# Patient Record
Sex: FEMALE | Race: WHITE | NOT HISPANIC OR LATINO | Employment: UNEMPLOYED | URBAN - METROPOLITAN AREA
[De-identification: names, ages, dates, MRNs, and addresses within clinical notes are randomized per-mention and may not be internally consistent; named-entity substitution may affect disease eponyms.]

---

## 2016-10-05 LAB
HBV SURFACE AG SERPL QL IA: NEGATIVE
HIV-1 AND HIV-2 ANTIBODIES: NON REACTIVE
RPR: NON REACTIVE
RUBELLA IMMUNE STATUS: NORMAL

## 2017-03-23 LAB
HBV SURFACE AG SERPL QL IA: NEGATIVE
HIV 1+2 AB+HIV1 P24 AG SERPL QL IA: NORMAL
HIV-1 AND HIV-2 ANTIBODIES: NON REACTIVE
PRENATAL STREP B CULTURE: NEGATIVE
RPR: NORMAL
RUBELLA IMMUNE STATUS: NORMAL

## 2017-04-05 ENCOUNTER — HOSPITAL ENCOUNTER (INPATIENT)
Facility: HOSPITAL | Age: 22
LOS: 4 days | Discharge: HOME | End: 2017-04-09
Attending: OBSTETRICS & GYNECOLOGY | Admitting: OBSTETRICS & GYNECOLOGY
Payer: OTHER GOVERNMENT

## 2017-04-05 DIAGNOSIS — O26.893 HEADACHE IN PREGNANCY, ANTEPARTUM, THIRD TRIMESTER: ICD-10-CM

## 2017-04-05 DIAGNOSIS — O14.13 SEVERE PRE-ECLAMPSIA IN THIRD TRIMESTER: ICD-10-CM

## 2017-04-05 DIAGNOSIS — O14.13 SEVERE PREECLAMPSIA, THIRD TRIMESTER: ICD-10-CM

## 2017-04-05 DIAGNOSIS — R51.9 HEADACHE IN PREGNANCY, ANTEPARTUM, THIRD TRIMESTER: ICD-10-CM

## 2017-04-05 PROBLEM — O14.10 SEVERE PREECLAMPSIA: Status: ACTIVE | Noted: 2017-04-05

## 2017-04-05 PROBLEM — O26.899 HEADACHE IN PREGNANCY, ANTEPARTUM: Status: ACTIVE | Noted: 2017-04-05

## 2017-04-05 LAB
ABO + RH BLD: NORMAL
ALBUMIN SERPL BCP-MCNC: 2.8 G/DL
ALP SERPL-CCNC: 131 U/L
ALT SERPL W/O P-5'-P-CCNC: 13 U/L
ANION GAP SERPL CALC-SCNC: 10 MMOL/L
AST SERPL-CCNC: 16 U/L
BACTERIA #/AREA URNS HPF: ABNORMAL /HPF
BASOPHILS # BLD AUTO: 0.01 K/UL
BASOPHILS NFR BLD: 0.1 %
BILIRUB SERPL-MCNC: 0.3 MG/DL
BILIRUB UR QL STRIP: NEGATIVE
BLD GP AB SCN CELLS X3 SERPL QL: NORMAL
BUN SERPL-MCNC: 5 MG/DL
CALCIUM SERPL-MCNC: 9.3 MG/DL
CHLORIDE SERPL-SCNC: 108 MMOL/L
CLARITY UR: CLEAR
CO2 SERPL-SCNC: 21 MMOL/L
COLOR UR: YELLOW
CREAT SERPL-MCNC: 0.6 MG/DL
CREAT UR-MCNC: 26.5 MG/DL
DIFFERENTIAL METHOD: ABNORMAL
EOSINOPHIL # BLD AUTO: 0.1 K/UL
EOSINOPHIL NFR BLD: 1.2 %
ERYTHROCYTE [DISTWIDTH] IN BLOOD BY AUTOMATED COUNT: 14.2 %
EST. GFR  (AFRICAN AMERICAN): >60 ML/MIN/1.73 M^2
EST. GFR  (NON AFRICAN AMERICAN): >60 ML/MIN/1.73 M^2
GLUCOSE SERPL-MCNC: 83 MG/DL
GLUCOSE UR QL STRIP: NEGATIVE
HCT VFR BLD AUTO: 34.6 %
HGB BLD-MCNC: 11.6 G/DL
HGB UR QL STRIP: NEGATIVE
HIV1+2 IGG SERPL QL IA.RAPID: NEGATIVE
KETONES UR QL STRIP: NEGATIVE
LEUKOCYTE ESTERASE UR QL STRIP: ABNORMAL
LYMPHOCYTES # BLD AUTO: 2.3 K/UL
LYMPHOCYTES NFR BLD: 20.4 %
MCH RBC QN AUTO: 29.2 PG
MCHC RBC AUTO-ENTMCNC: 33.5 %
MCV RBC AUTO: 87 FL
MICROSCOPIC COMMENT: ABNORMAL
MONOCYTES # BLD AUTO: 1 K/UL
MONOCYTES NFR BLD: 9 %
NEUTROPHILS # BLD AUTO: 7.8 K/UL
NEUTROPHILS NFR BLD: 69.3 %
NITRITE UR QL STRIP: NEGATIVE
PH UR STRIP: 6 [PH] (ref 5–8)
PLATELET # BLD AUTO: 243 K/UL
PMV BLD AUTO: 11 FL
POTASSIUM SERPL-SCNC: 3.9 MMOL/L
PROT SERPL-MCNC: 6.2 G/DL
PROT UR QL STRIP: NEGATIVE
PROT UR-MCNC: 15 MG/DL
PROT/CREAT RATIO, UR: 0.57
RBC # BLD AUTO: 3.97 M/UL
RPR SER QL: NORMAL
SODIUM SERPL-SCNC: 139 MMOL/L
SP GR UR STRIP: 1.01 (ref 1–1.03)
URN SPEC COLLECT METH UR: ABNORMAL
UROBILINOGEN UR STRIP-ACNC: NEGATIVE EU/DL
WBC # BLD AUTO: 11.2 K/UL
WBC #/AREA URNS HPF: 4 /HPF (ref 0–5)

## 2017-04-05 PROCEDURE — 63600175 PHARM REV CODE 636 W HCPCS: Performed by: ADVANCED PRACTICE MIDWIFE

## 2017-04-05 PROCEDURE — 86701 HIV-1ANTIBODY: CPT

## 2017-04-05 PROCEDURE — 72100002 HC LABOR CARE, 1ST 8 HOURS

## 2017-04-05 PROCEDURE — 51702 INSERT TEMP BLADDER CATH: CPT

## 2017-04-05 PROCEDURE — 86592 SYPHILIS TEST NON-TREP QUAL: CPT

## 2017-04-05 PROCEDURE — 84156 ASSAY OF PROTEIN URINE: CPT

## 2017-04-05 PROCEDURE — 25000003 PHARM REV CODE 250: Performed by: ADVANCED PRACTICE MIDWIFE

## 2017-04-05 PROCEDURE — 3E033VJ INTRODUCTION OF OTHER HORMONE INTO PERIPHERAL VEIN, PERCUTANEOUS APPROACH: ICD-10-PCS | Performed by: OBSTETRICS & GYNECOLOGY

## 2017-04-05 PROCEDURE — 81000 URINALYSIS NONAUTO W/SCOPE: CPT

## 2017-04-05 PROCEDURE — 86900 BLOOD TYPING SEROLOGIC ABO: CPT

## 2017-04-05 PROCEDURE — 85025 COMPLETE CBC W/AUTO DIFF WBC: CPT

## 2017-04-05 PROCEDURE — 72100003 HC LABOR CARE, EA. ADDL. 8 HRS

## 2017-04-05 PROCEDURE — 99211 OFF/OP EST MAY X REQ PHY/QHP: CPT | Mod: 25

## 2017-04-05 PROCEDURE — 80053 COMPREHEN METABOLIC PANEL: CPT

## 2017-04-05 PROCEDURE — 86901 BLOOD TYPING SEROLOGIC RH(D): CPT

## 2017-04-05 PROCEDURE — 11000001 HC ACUTE MED/SURG PRIVATE ROOM

## 2017-04-05 PROCEDURE — 25000003 PHARM REV CODE 250: Performed by: OBSTETRICS & GYNECOLOGY

## 2017-04-05 PROCEDURE — 59025 FETAL NON-STRESS TEST: CPT

## 2017-04-05 RX ORDER — SODIUM CHLORIDE, SODIUM LACTATE, POTASSIUM CHLORIDE, CALCIUM CHLORIDE 600; 310; 30; 20 MG/100ML; MG/100ML; MG/100ML; MG/100ML
INJECTION, SOLUTION INTRAVENOUS CONTINUOUS
Status: DISCONTINUED | OUTPATIENT
Start: 2017-04-05 | End: 2017-04-07

## 2017-04-05 RX ORDER — BUTALBITAL, ACETAMINOPHEN AND CAFFEINE 50; 325; 40 MG/1; MG/1; MG/1
1 TABLET ORAL EVERY 4 HOURS PRN
Status: DISCONTINUED | OUTPATIENT
Start: 2017-04-05 | End: 2017-04-05

## 2017-04-05 RX ORDER — BUTALBITAL, ACETAMINOPHEN AND CAFFEINE 50; 325; 40 MG/1; MG/1; MG/1
1 TABLET ORAL ONCE
Status: COMPLETED | OUTPATIENT
Start: 2017-04-05 | End: 2017-04-05

## 2017-04-05 RX ORDER — OXYTOCIN/RINGER'S LACTATE 20/1000 ML
2 PLASTIC BAG, INJECTION (ML) INTRAVENOUS CONTINUOUS
Status: DISCONTINUED | OUTPATIENT
Start: 2017-04-05 | End: 2017-04-05

## 2017-04-05 RX ORDER — MISOPROSTOL 100 MCG
25 TABLET ORAL EVERY 4 HOURS
Status: COMPLETED | OUTPATIENT
Start: 2017-04-05 | End: 2017-04-06

## 2017-04-05 RX ORDER — HYDRALAZINE HYDROCHLORIDE 20 MG/ML
5 INJECTION INTRAMUSCULAR; INTRAVENOUS ONCE
Status: DISCONTINUED | OUTPATIENT
Start: 2017-04-05 | End: 2017-04-05

## 2017-04-05 RX ORDER — MAGNESIUM SULFATE HEPTAHYDRATE 40 MG/ML
2 INJECTION, SOLUTION INTRAVENOUS CONTINUOUS
Status: DISCONTINUED | OUTPATIENT
Start: 2017-04-05 | End: 2017-04-07

## 2017-04-05 RX ORDER — ONDANSETRON 8 MG/1
8 TABLET, ORALLY DISINTEGRATING ORAL EVERY 8 HOURS PRN
Status: DISCONTINUED | OUTPATIENT
Start: 2017-04-05 | End: 2017-04-05

## 2017-04-05 RX ORDER — ACETAMINOPHEN 500 MG
500 TABLET ORAL EVERY 6 HOURS PRN
Status: DISCONTINUED | OUTPATIENT
Start: 2017-04-05 | End: 2017-04-05

## 2017-04-05 RX ORDER — MISOPROSTOL 100 MCG
25 TABLET ORAL EVERY 4 HOURS
Status: DISCONTINUED | OUTPATIENT
Start: 2017-04-05 | End: 2017-04-05

## 2017-04-05 RX ORDER — ACETAMINOPHEN 500 MG
1000 TABLET ORAL ONCE
Status: COMPLETED | OUTPATIENT
Start: 2017-04-05 | End: 2017-04-05

## 2017-04-05 RX ORDER — MAGNESIUM SULFATE HEPTAHYDRATE 40 MG/ML
2 INJECTION, SOLUTION INTRAVENOUS ONCE
Status: COMPLETED | OUTPATIENT
Start: 2017-04-05 | End: 2017-04-05

## 2017-04-05 RX ORDER — CALCIUM GLUCONATE 98 MG/ML
1 INJECTION, SOLUTION INTRAVENOUS
Status: DISCONTINUED | OUTPATIENT
Start: 2017-04-05 | End: 2017-04-09 | Stop reason: HOSPADM

## 2017-04-05 RX ORDER — MAGNESIUM SULFATE HEPTAHYDRATE 40 MG/ML
4 INJECTION, SOLUTION INTRAVENOUS ONCE
Status: COMPLETED | OUTPATIENT
Start: 2017-04-05 | End: 2017-04-05

## 2017-04-05 RX ORDER — SODIUM CHLORIDE, SODIUM LACTATE, POTASSIUM CHLORIDE, CALCIUM CHLORIDE 600; 310; 30; 20 MG/100ML; MG/100ML; MG/100ML; MG/100ML
1000 INJECTION, SOLUTION INTRAVENOUS CONTINUOUS
Status: ACTIVE | OUTPATIENT
Start: 2017-04-05 | End: 2017-04-05

## 2017-04-05 RX ORDER — TERBUTALINE SULFATE 1 MG/ML
0.25 INJECTION SUBCUTANEOUS
Status: DISCONTINUED | OUTPATIENT
Start: 2017-04-05 | End: 2017-04-07

## 2017-04-05 RX ORDER — ONDANSETRON 8 MG/1
8 TABLET, ORALLY DISINTEGRATING ORAL EVERY 8 HOURS PRN
Status: DISCONTINUED | OUTPATIENT
Start: 2017-04-05 | End: 2017-04-07

## 2017-04-05 RX ADMIN — BUTALBITAL, ACETAMINOPHEN, AND CAFFEINE 1 TABLET: 50; 325; 40 TABLET ORAL at 03:04

## 2017-04-05 RX ADMIN — BUTALBITAL, ACETAMINOPHEN, AND CAFFEINE 1 TABLET: 50; 325; 40 TABLET ORAL at 06:04

## 2017-04-05 RX ADMIN — MAGNESIUM SULFATE IN WATER 4 G: 40 INJECTION, SOLUTION INTRAVENOUS at 04:04

## 2017-04-05 RX ADMIN — SODIUM CHLORIDE, SODIUM LACTATE, POTASSIUM CHLORIDE, AND CALCIUM CHLORIDE 1000 ML: .6; .31; .03; .02 INJECTION, SOLUTION INTRAVENOUS at 04:04

## 2017-04-05 RX ADMIN — Medication 25 MCG: at 05:04

## 2017-04-05 RX ADMIN — MAGNESIUM SULFATE IN WATER 2 G/HR: 40 INJECTION, SOLUTION INTRAVENOUS at 05:04

## 2017-04-05 RX ADMIN — ACETAMINOPHEN 1000 MG: 500 TABLET ORAL at 11:04

## 2017-04-05 RX ADMIN — Medication 25 MCG: at 04:04

## 2017-04-05 RX ADMIN — MAGNESIUM SULFATE IN WATER 2 G: 40 INJECTION, SOLUTION INTRAVENOUS at 04:04

## 2017-04-05 RX ADMIN — SODIUM CHLORIDE, SODIUM LACTATE, POTASSIUM CHLORIDE, AND CALCIUM CHLORIDE: .6; .31; .03; .02 INJECTION, SOLUTION INTRAVENOUS at 04:04

## 2017-04-05 RX ADMIN — Medication 25 MCG: at 08:04

## 2017-04-05 NOTE — IP AVS SNAPSHOT
[facility]  [id number] [place] [address] [address] [place] [id number]           Patient Discharge Instructions   Our goal is to set you up for success. This packet includes information on your condition, medications, and your home care.  It will help you care for yourself to prevent having to return to the hospital.     Please ask your nurse if you have any questions.      There are many details to remember when preparing to leave the hospital. Here is what you will need to do:    1. Take your medicine. If you are prescribed medications, review your Medication List on the following pages. You may have new medications to  at the pharmacy and others that you'll need to stop taking. Review the instructions for how and when to take your medications. Talk with your doctor or nurses if you are unsure of what to do.     2. Go to your follow-up appointments. Specific follow-up information is listed in the following pages. Your may be contacted by a nurse or clinical provider about future appointments. Be sure we have all of the phone numbers to reach you. Please contact your provider's office if you are unable to make an appointment.     3. Watch for warning signs. Your doctor or nurse will give you detailed warning signs to watch for and when to call for assistance. These instructions may also include educational information about your condition. If you experience any of warning signs to your health, call your doctor.           [facility]  Unless otherwise directed by your provider, please   contact [facility], our nurse care line   that is available for 24/7 assistance.     [phone] (toll-free)     Registered nurses in the [facility]   provide: appointment scheduling, clinical advisement, health education, and other advisory services.                  ** Verify the list of medication(s) below is accurate and up to date. Carry this with you in case of emergency. If your  medications have changed, please notify your healthcare provider.             Medication List      CONTINUE taking these medications        Additional Info                      multivitamin capsule   Refills:  0   Dose:  1 capsule    Instructions:  Take 1 capsule by mouth once daily.     Begin Date    AM    Noon    PM    Bedtime                  Please bring to all follow up appointments:    1. A copy of your discharge instructions.  2. All medicines you are currently taking in their original bottles.  3. Identification and insurance card.    Please arrive 15 minutes ahead of scheduled appointment time.    Please call 24 hours in advance if you must reschedule your appointment and/or time.        Your Scheduled Appointments     Apr 17, 2017 11:00 AM CDT   Nurse Visit with OB GYN NURSECARISSA - OB/ GYN ([facility])    [address] [place] [id number]   [phone]            May 10, 2017 10:00 AM CDT   Post Partum with MARICRUZ Nowak - OB/ GYN ([facility])    [address] [place] [id number]   [phone]              Follow-up Information     Follow up In 1 week.    Why:  [facility]          Discharge Instructions       Mother Self Care:    Activity: Avoid strenuous exercise and get adequate rest.  No driving until your physician gives you consent.  Emotional Changes: The grieving process has many different stages, be prepared to experience lots of emotional ups and downs. Identify people to be your support system, and do not hesitate to call our  if you need someone to talk to.   Breast Care: You may notice milk leaking from your breasts. Wear a support bra 24 hours a day for one week or wrap breasts in an ace bandage if needed to stop milk production.  Avoid stimulation to breasts.  You may use ice packs for discomfort.  Corrina-Care/Vaginal Bleeding: Remember to use your corrina-bottle after urinating.  Your flow  "will change from red, to pink, to yellow/white color over a period of 2 weeks.  Menstruation will return in 3-8 weeks.  Episiotomy Vaginal Delivery: Stitches will dissolve within 10 days to 3 weeks.  Warm baths, tucks, and dermoplast spray will promote healing.  Avoid bubble baths or strong soaps.   Section/Tubal Ligation: Keep incision clean and dry.  Please remove steri-strips in 5-7 days.  You may shower, but avoid baths.  Sexual Activity/Pelvic Rest: No sexual activity, tampons, or douching until your physician gives you consent.  Diet: Continue to eat from the five basic food groups, including plenty of protein, fruits, vegetables, and whole grains.  Limit empty calories and high fat foods.  Drink enough fluids to satisfy thirst.  Constipation/Hemorrhoids: Drink plenty of water.  You may take a stool softener or natural laxative (Metamucil). You may use tucks or hemorrhoid ointment and soak in a warm tub.    CALL YOUR OB DOCTOR IF ANY OF THE FOLLOWING OCCURS:  *Heavy bleeding - saturating a pad an hour or passing any large (2-3 inches in size) blood clots.  *Any pain, redness, or tenderness in lower leg.  *You cannot care for yourself  *Any signs of infection-      - Temperature greater than 100.5 degrees F      - Foul smelling vaginal discharge and/or incisional drainage      - Increased episiotomy or incisional pain      - Hot, hard, red or sore area on breast      - Flu-like symptoms      - Any urgency, frequency or burning with urination      Primary Diagnosis     Your primary diagnosis was:  Normal Vaginal Delivery      Admission Information     Date & Time Provider Department CSN    2017  1:30 AM Anum Patel MD [facility] [id number]      Care Providers     Provider Role Specialty Primary office phone    Anum Patel MD Attending Provider Obstetrics [phone]      Your Vitals Were     BP Pulse Temp Resp Height Weight    133/63 74 98.5 °F (36.9 °C) (Oral) 18 5' 4" (1.626 " m) 94.3 kg (208 lb)    SpO2 BMI             94% 35.7 kg/m2         Recent Lab Values     No lab values to display.      Allergies as of 4/9/2017        Reactions    Amoxil [Amoxicillin]       Advance Directives     An advance directive is a document which, in the event you are no longer able to make decisions for yourself, tells your healthcare team what kind of treatment you do or do not want to receive, or who you would like to make those decisions for you.  If you do not currently have an advance directive, [facility] encourages you to create one.  For more information call:  [phone]), [phone] ([phone]),  or log on to [email or web address].        Language Assistance Services     ATTENTION: Language assistance services are available, free of charge. Please call [phone].      ATENCIÓN: Si habla español, tiene a dominguez disposición servicios gratuitos de asistencia lingüística. Llame al [phone].     CHÚ Ý: N?u b?n nói Ti?ng Vi?t, có các d?ch v? h? tr? ngôn ng? mi?n phí dành cho b?n. G?i s? [phone].        [facility] Sign-Up     Activating your [facility] account is as easy as 1-2-3!     1) Visit [email or web address], select Sign Up Now, enter this activation code and your date of birth, then select Next.  [email or web address]  Expires: 5/24/2017  8:36 AM      2) Create a username and password to use when you visit [facility] in the future and select a security question in case you lose your password and select Next.    3) Enter your e-mail address and click Sign Up!    Additional Information  If you have questions, please e-mail [email or web address] or call [phone] to talk to our [facility] staff. Remember, [facility] is NOT to be used for urgent needs. For medical emergencies, dial 911.          [facility] complies with applicable Federal civil rights laws and does not discriminate on the basis of race, color, national origin, age, disability, or sex.

## 2017-04-05 NOTE — NURSING
"Discussed feeding choice with mother.  Reviewed benefits of breastfeeding.  Patient given "What to Expect in the First 48 Hours" handout. Mother states her intention is to BF. First time mom/no previous feeding experience.     Coffective counseling sheet Fall in Love discussed with mother. Reinforced immediate skin to skin, the magic first hour, importance of the first feeding and delaying routine procedures. Encouraged mother to download [employer] mobile alley if she has not already done so. Mother verbalies understanding.    "

## 2017-04-05 NOTE — H&P
"    Labor and Delivery Triage H&P Note - Preeclampsia/PIH      Subjective:    Patient Info:  Beth Jo         21 y.o.    female    1995     Patient presents at 37 and 6/7 weeks gestation with EDC 2017.  She presents with severe headache from home. Headache started at 2100, reports headache as staring at the base of her head and now frontal.  Her  also reports that she is randomly twitching.  Associated symptoms include headache, RUQ abdominal pain, edema.   Fetal Movement: normal. Her current obstetrical history is significant for none per pt. She reports no bleeding and no leaking.       OB History      Para Term  AB TAB SAB Ectopic Multiple Living    1                    Estimated Date of Delivery: 17    Gestational Age: 37w6d    Past Medical History:  No past medical history on file.     Past Surgical History:  Beth  has no past surgical history on file.    Social History:   Beth      Family History:  No family history on file.    Allergies:  Amoxil [amoxicillin]    Meds Prior to Arrival:  Prescriptions Prior to Admission   Medication Sig Dispense Refill Last Dose    multivitamin capsule Take 1 capsule by mouth once daily.   2017 at Unknown time       Review of Systems:  Constitutional: no fever or chills, pain well controlled  Respiratory: no cough or shortness of breath  Cardiovascular: no chest pain or palpitations  Gastrointestinal: no nausea or vomiting, tolerating diet  Genitourinary: no hematuria or dysuria  Musculoskeletal: no arthralgias or myalgias  Neurological: positive for headaches and twitching      Objective:    Recent Vitals:  BP (!) 140/79  Pulse 66  Temp 98.1 °F (36.7 °C) (Oral)  Resp 20  Ht 5' 4" (1.626 m)  Wt 94.3 kg (208 lb)  Breastfeeding? No  BMI 35.7 kg/m2  Exam:    SVE: /-1, midposition, soft    General Appearance:    Alert, cooperative, moderate distress, eyes closed, appears   stated age, obese   Head:    Normocephalic, without " obvious abnormality, atraumatic   Lungs:     Clear to auscultation bilaterally, respirations unlabored    Heart:    Regular rate and rhythm, S1 and S2 normal, no murmur   Abdomen:     Soft, non-tender, specific RUQ tender, gravid, S=D for 38 week gestation   Genitalia:       Deferred. Rectal exam deferred.     Extremities:   Extremities normal, atraumatic, edema present 3+   Pulses:   2+ and symmetric all extremities, Reflexes 3+, Clonus absent   Skin:   Skin color, texture normal, no rashes or lesions     Lab Results:  Recent Results (from the past 36 hour(s))   Urinalysis    Collection Time: 04/05/17  2:00 AM   Result Value Ref Range    Specimen UA Urine, Clean Catch     Color, UA Yellow Yellow, Straw, Lauren    Appearance, UA Clear Clear    pH, UA 6.0 5.0 - 8.0    Specific Gravity, UA 1.010 1.005 - 1.030    Protein, UA Negative Negative    Glucose, UA Negative Negative    Ketones, UA Negative Negative    Bilirubin (UA) Negative Negative    Occult Blood UA Negative Negative    Nitrite, UA Negative Negative    Urobilinogen, UA Negative <2.0 EU/dL    Leukocytes, UA 1+ (A) Negative   Protein / creatinine ratio, urine    Collection Time: 04/05/17  2:00 AM   Result Value Ref Range    Protein, Urine Random 15 0 - 15 mg/dL    Creatinine, Random Ur 26.5 15.0 - 325.0 mg/dL    Prot/Creat Ratio, Ur 0.57 (H) 0.00 - 0.20   Urinalysis Microscopic    Collection Time: 04/05/17  2:00 AM   Result Value Ref Range    WBC, UA 4 0 - 5 /hpf    Bacteria, UA Few (A) None-Occ /hpf    Microscopic Comment SEE COMMENT    CBC auto differential    Collection Time: 04/05/17  2:18 AM   Result Value Ref Range    WBC 11.20 3.90 - 12.70 K/uL    RBC 3.97 (L) 4.00 - 5.40 M/uL    Hemoglobin 11.6 (L) 12.0 - 16.0 g/dL    Hematocrit 34.6 (L) 37.0 - 48.5 %    MCV 87 82 - 98 fL    MCH 29.2 27.0 - 31.0 pg    MCHC 33.5 32.0 - 36.0 %    RDW 14.2 11.5 - 14.5 %    Platelets 243 150 - 350 K/uL    MPV 11.0 9.2 - 12.9 fL    Gran # 7.8 (H) 1.8 - 7.7 K/uL    Lymph #  2.3 1.0 - 4.8 K/uL    Mono # 1.0 0.3 - 1.0 K/uL    Eos # 0.1 0.0 - 0.5 K/uL    Baso # 0.01 0.00 - 0.20 K/uL    Gran% 69.3 38.0 - 73.0 %    Lymph% 20.4 18.0 - 48.0 %    Mono% 9.0 4.0 - 15.0 %    Eosinophil% 1.2 0.0 - 8.0 %    Basophil% 0.1 0.0 - 1.9 %    Differential Method Automated    Comprehensive metabolic panel    Collection Time: 04/05/17  2:18 AM   Result Value Ref Range    Sodium 139 136 - 145 mmol/L    Potassium 3.9 3.5 - 5.1 mmol/L    Chloride 108 95 - 110 mmol/L    CO2 21 (L) 23 - 29 mmol/L    Glucose 83 70 - 110 mg/dL    BUN, Bld 5 (L) 6 - 20 mg/dL    Creatinine 0.6 0.5 - 1.4 mg/dL    Calcium 9.3 8.7 - 10.5 mg/dL    Total Protein 6.2 6.0 - 8.4 g/dL    Albumin 2.8 (L) 3.5 - 5.2 g/dL    Total Bilirubin 0.3 0.1 - 1.0 mg/dL    Alkaline Phosphatase 131 55 - 135 U/L    AST 16 10 - 40 U/L    ALT 13 10 - 44 U/L    Anion Gap 10 8 - 16 mmol/L    eGFR if African American >60 >60 mL/min/1.73 m^2    eGFR if non African American >60 >60 mL/min/1.73 m^2         Diagnostic Studies:    Baseline: 135 bpm, Variability: Good {> 6 bpm) and Accelerations: Reactive    Serial BPs  CBC  CMP  UA  PCR urine      Assessment:    Dx:   1. Headache in pregnancy, antepartum, third trimester    2. Severe preeclampsia, third trimester      Active Problems:  Patient Active Problem List    Diagnosis Date Noted    Headache in pregnancy, antepartum 04/05/2017    Severe pre-eclampsia in third trimester 04/05/2017    Severe preeclampsia 04/05/2017       Plan:    Admit to MD: Anum Patel MD    Admit to: Outpatient procedure    observation    Code Status: Full Code       Diagnostic Plan/Orders:  Orders Placed This Encounter   Procedures    Urinalysis    Protein / creatinine ratio, urine    CBC auto differential    Comprehensive metabolic panel    Urinalysis Microscopic    RPR    Rapid HIV    Diet NPO Except for: Ice Chips, Other (Comment)    Ambulate    Place KARLI hose    Place sequential compression device    Discontinue  oxytocin    Administer oxygen at 10 L/min per non rebreather face mask    Change position, roll left    Change position, roll right    Check Temperature, Membranes Intact    Check Temperature, Membranes Ruptured    Baseline Deep Tendon Reflexes, respirations, vital signs    Vital Signs Every Hour    Vital signs Q15 minutes x 4    Check Temperature    Cervical Exam    Fetal / Uterine Monitoring    Fetal monitoring - scalp electrode    Strict intake and output maximum IV/total intake not to exceed 125 mL/hr    External fetal monitoring    Discontinue Ringers Lactate with Oxytocin infusion    Notify Physician    Notify physician     Notify physician     Notify physician    Notify physician    Notify physician (specify)    Place sequential compression device    Inpatient consult to Anesthesiology    Type & Screen    Admit to Inpatient

## 2017-04-05 NOTE — PLAN OF CARE
Problem: Patient Care Overview  Goal: Plan of Care Review  Outcome: Ongoing (interventions implemented as appropriate)  VSS. Cytotec induction. Mag at 2g/hr. Reflexes 3+. Ross in place. SCDs on. Rails padded. Suction and oxygen at bedside. Headache during shift. Tylenol given. Pt still planning natural delivery. Family at bedside. Will continue to monitor.

## 2017-04-05 NOTE — PLAN OF CARE
Problem: Patient Care Overview  Goal: Plan of Care Review  Outcome: Ongoing (interventions implemented as appropriate)  Admit with severe headaches and PCR ratio of 0.57. Fioricet given with relief. BP's 140s-150s/60s-80s. Loading dose of 6 g Magnesium Sulfate given followed by continuous 2 g/hr Magnesium Sulfate infusion. Ross, SCDs, and pulse ox in place. Cytotec placed @ 0545. Currently 1/70/-1.

## 2017-04-05 NOTE — PROGRESS NOTES
S:  @ 37.6 wks, admitted for management of pre-eclampsia and IOL w/ cytotec. Has had total of 1 dose vaginally w rapid response to same with freqent UC's and cramping. No bleeding or lof. Patient was planning NCB with [facility] and is processing new poc with current clinical status. Still tearful, but coping with events. Magnesium sulfate at 2 gm/hr. Patient had severe headache on arrival but currently resolved. Denies blurred vision. No n/v and asking for juice/ foods when able. Has expressed that understands complete BR indicated for care at this point despite was hoping to ambulate in labor.   O: /80, p 82, r 20  Ox sat 100%.        DTR's 2-3+, no clonus. Edema 3+ pitting lower extrem.        General: AAO x 3       Heart: RRR w/o m.       Chest: CTA throughout.       Abd: soft to palpation in between ctx. Is ctx q 2-3 via toco, mild to mod. Baby vertex by Leopolds'. EFW 6-1/2-7#.       FHT: Cat 1 , baseline 140, moderate variability with accels. No decels noted.        VE: Deferred (previous /- with cytotec placement).       Ext: As above . SCD's in use.        Ross with adequate output.   A:       Severe pre-e      IOL w cytotec  P: Continue present poc.       Cytotec q 4 as ordered if eligible.       To remain NPO till further assessment of clinical status and VS. Will allow juice/ crackers IF not hypertensive, no h/a, no indication for pain medication/or epidural , but understands this plan is very tentative due to current clinical concerns.      BEDREST cont.       Will re-evaluate in 2-3 hours.

## 2017-04-05 NOTE — PROGRESS NOTES
"Patient with ongoing contractions that now "feel a little bit more intense, but still no more than a 4/10. Headache slightly increased at present. Discussed with patient need for VE now to assess whether more cytotec vs singh bulb indicated.   Patient expressed anxiety over discomfort/vaginismus history. Reassured.  VSS  Fht's cat 1  VE: EOS 1/ IOS FT/ 70/ -2  Ctx q 3-4  P- Pt tolerated exam well, but no cervical change definitively noted and not able to place singh bulb at present.       Will proceed with additional cytotec dosing, orally for ctx enhancement      Tylenol as ordered for h/a       Continue MG so4 @ 2 gm/hr.      Re-evaluate in 3-4 hours.   "

## 2017-04-05 NOTE — PROGRESS NOTES
"Patient remains on mag sulfate at 2 gms/hr. Cramping continues as before and noted q 2-4 mins apart. Pain rated a "3-4/ 10".  Very mild headache noted behind right ear and jaw. No lof, bleeding or spotting.   Resting in rt tilt position at present.   VSS- 137/66, p- 86. Ox sat 98%. No change in mentation or respiration status.   Chest: remains CTA  ABD: soft with contractions q 2-4 via toco.  VE: deferred  FHT: cat 1  Cytotec remains on hold s econdary to contractions > 3/ 10 min.   Will repeat cytotec if contractions space out.    "

## 2017-04-06 PROCEDURE — 63600175 PHARM REV CODE 636 W HCPCS: Performed by: ADVANCED PRACTICE MIDWIFE

## 2017-04-06 PROCEDURE — 25000003 PHARM REV CODE 250: Performed by: OBSTETRICS & GYNECOLOGY

## 2017-04-06 PROCEDURE — 72100002 HC LABOR CARE, 1ST 8 HOURS

## 2017-04-06 PROCEDURE — 25000003 PHARM REV CODE 250: Performed by: ADVANCED PRACTICE MIDWIFE

## 2017-04-06 PROCEDURE — 11000001 HC ACUTE MED/SURG PRIVATE ROOM

## 2017-04-06 PROCEDURE — 72100003 HC LABOR CARE, EA. ADDL. 8 HRS

## 2017-04-06 RX ORDER — MISOPROSTOL 100 MCG
25 TABLET ORAL EVERY 4 HOURS
Status: ACTIVE | OUTPATIENT
Start: 2017-04-06 | End: 2017-04-06

## 2017-04-06 RX ORDER — OXYTOCIN/RINGER'S LACTATE 20/1000 ML
2 PLASTIC BAG, INJECTION (ML) INTRAVENOUS CONTINUOUS
Status: DISCONTINUED | OUTPATIENT
Start: 2017-04-07 | End: 2017-04-07

## 2017-04-06 RX ORDER — BUTORPHANOL TARTRATE 1 MG/ML
2 INJECTION INTRAMUSCULAR; INTRAVENOUS ONCE
Status: COMPLETED | OUTPATIENT
Start: 2017-04-06 | End: 2017-04-06

## 2017-04-06 RX ADMIN — MAGNESIUM SULFATE IN WATER 2 G/HR: 40 INJECTION, SOLUTION INTRAVENOUS at 04:04

## 2017-04-06 RX ADMIN — SODIUM CHLORIDE, SODIUM LACTATE, POTASSIUM CHLORIDE, AND CALCIUM CHLORIDE: .6; .31; .03; .02 INJECTION, SOLUTION INTRAVENOUS at 06:04

## 2017-04-06 RX ADMIN — SODIUM CHLORIDE, SODIUM LACTATE, POTASSIUM CHLORIDE, AND CALCIUM CHLORIDE: .6; .31; .03; .02 INJECTION, SOLUTION INTRAVENOUS at 05:04

## 2017-04-06 RX ADMIN — MAGNESIUM SULFATE IN WATER 2 G/HR: 40 INJECTION, SOLUTION INTRAVENOUS at 12:04

## 2017-04-06 RX ADMIN — Medication 25 MCG: at 12:04

## 2017-04-06 RX ADMIN — Medication 25 MCG: at 04:04

## 2017-04-06 RX ADMIN — BUTORPHANOL TARTRATE 2 MG: 1 INJECTION, SOLUTION INTRAMUSCULAR; INTRAVENOUS at 11:04

## 2017-04-06 RX ADMIN — DINOPROSTONE 10 MG: 10 INSERT VAGINAL at 09:04

## 2017-04-06 RX ADMIN — MAGNESIUM SULFATE IN WATER 2 G/HR: 40 INJECTION, SOLUTION INTRAVENOUS at 06:04

## 2017-04-06 NOTE — PROGRESS NOTES
S:G1PO, @ 38 wks, Pre-E, IOL ongoing. Cytotec # 3 given po. Ongoing mild cramping, no lof, bleeding, spotting.       Patient remains very anxious/over VE.  No headache at present. Very hungry. MagSo4 continue @ 2gm/hr. Last exam ~ 2pm with closed IOS and not eligible for singh bulb placement. Patient with significant h/o vaginismus and exam difficult with much relaxation/ verbal support given to accomplish exam.   O: VSS:  139/81, p 75- Ox sats 100%, no mentation or respiratory depression.        FHT's cat 1        VE: deferred.        CTX: q 5         Singh: output adequate.   A: , IOL, pre-e      Severe vaginismus      Cytotec po.  P: Continue cytotec po q 4 to maintain contraction adequacy.       VE  In am with attempt for singh bulb if eligible.

## 2017-04-06 NOTE — PROGRESS NOTES
S: doing well, visiting with her mother  O:  VS reviewed, afebrile   Vitals:    04/06/17 1121 04/06/17 1123 04/06/17 1128 04/06/17 1133   BP: 126/63      Pulse: 76 72 78 76   Resp:       Temp:       TempSrc:       SpO2:  98% 98% 98%   Weight:       Height:           FHTs Cat 1   UC Q 2-5 min  SVE deferred, cervidil in place    A: IUP @ 38w0d ;     Patient Active Problem List   Diagnosis    Headache in pregnancy, antepartum    Severe pre-eclampsia in third trimester    Severe preeclampsia       P:   Continue close monitoring of maternal/fetal status, discussed plan, CPM for now

## 2017-04-06 NOTE — PROGRESS NOTES
CNM at bedside with POC discussed with patient and spouse. Cervidil to be inserted after patient allowed to eat light meal. Patient may have singh discontinued after cervidil insertion and bedpan used.

## 2017-04-06 NOTE — PLAN OF CARE
Problem: Patient Care Overview  Goal: Plan of Care Review  Outcome: Ongoing (interventions implemented as appropriate)  Patient continued on magnesium infusion throughout shift. Reflexes average, breath sounds clear. Urine output normal. Patient tolerated Cytotec well. Patient denies needs at this time.

## 2017-04-06 NOTE — PROGRESS NOTES
S:G1PO, @ 38 wks, Pre-E, IOL ongoing. Cytotec # 5 completed.  Ongoing mild cramping, no lof, bleeding, spotting.       MagSo4 continue @ 2gm/hr. Last exam ~ 2pm with closed IOS and not eligible for singh bulb placement. Limited ve due to severe anxiety w ve. But no s/s active labor  O: VSS:  127/67  p 71- Ox sats 97%         FHT's cat 1        VE: deferred.        CTX: q 5         Singh: output adequate.   A: , IOL, pre-e      Severe vaginismus      Cytotec po.  P: Continue cytotec po q 4 to maintain contraction adequacy.       VE  In am with attempt for singh bulb if eligible.

## 2017-04-06 NOTE — PLAN OF CARE
Problem: Patient Care Overview  Goal: Plan of Care Review  Outcome: Ongoing (interventions implemented as appropriate)  Patient with cervidil at 0918 this am.

## 2017-04-07 ENCOUNTER — ANESTHESIA (OUTPATIENT)
Dept: OBSTETRICS AND GYNECOLOGY | Facility: HOSPITAL | Age: 22
End: 2017-04-07
Payer: OTHER GOVERNMENT

## 2017-04-07 PROBLEM — O14.10 SEVERE PREECLAMPSIA: Status: RESOLVED | Noted: 2017-04-05 | Resolved: 2017-04-07

## 2017-04-07 PROCEDURE — 72200005 HC VAGINAL DELIVERY LEVEL II

## 2017-04-07 PROCEDURE — 27800517 HC TRAY,EPIDURAL-CONTINUOUS: Performed by: NURSE ANESTHETIST, CERTIFIED REGISTERED

## 2017-04-07 PROCEDURE — 63600175 PHARM REV CODE 636 W HCPCS: Performed by: NURSE ANESTHETIST, CERTIFIED REGISTERED

## 2017-04-07 PROCEDURE — 25000003 PHARM REV CODE 250: Performed by: ADVANCED PRACTICE MIDWIFE

## 2017-04-07 PROCEDURE — 62326 NJX INTERLAMINAR LMBR/SAC: CPT | Performed by: NURSE ANESTHETIST, CERTIFIED REGISTERED

## 2017-04-07 PROCEDURE — 72100003 HC LABOR CARE, EA. ADDL. 8 HRS

## 2017-04-07 PROCEDURE — 0KQM0ZZ REPAIR PERINEUM MUSCLE, OPEN APPROACH: ICD-10-PCS | Performed by: OBSTETRICS & GYNECOLOGY

## 2017-04-07 PROCEDURE — 11000001 HC ACUTE MED/SURG PRIVATE ROOM

## 2017-04-07 PROCEDURE — 25000003 PHARM REV CODE 250: Performed by: OBSTETRICS & GYNECOLOGY

## 2017-04-07 PROCEDURE — 25000003 PHARM REV CODE 250: Performed by: NURSE ANESTHETIST, CERTIFIED REGISTERED

## 2017-04-07 PROCEDURE — 63600175 PHARM REV CODE 636 W HCPCS: Performed by: OBSTETRICS & GYNECOLOGY

## 2017-04-07 PROCEDURE — 59409 OBSTETRICAL CARE: CPT | Mod: ,,, | Performed by: OBSTETRICS & GYNECOLOGY

## 2017-04-07 RX ORDER — IBUPROFEN 600 MG/1
600 TABLET ORAL EVERY 6 HOURS
Status: DISCONTINUED | OUTPATIENT
Start: 2017-04-07 | End: 2017-04-09 | Stop reason: HOSPADM

## 2017-04-07 RX ORDER — HYDROCORTISONE 25 MG/G
CREAM TOPICAL 3 TIMES DAILY PRN
Status: DISCONTINUED | OUTPATIENT
Start: 2017-04-07 | End: 2017-04-09 | Stop reason: HOSPADM

## 2017-04-07 RX ORDER — ROPIVACAINE HYDROCHLORIDE 2 MG/ML
INJECTION, SOLUTION EPIDURAL; INFILTRATION; PERINEURAL CONTINUOUS PRN
Status: DISCONTINUED | OUTPATIENT
Start: 2017-04-07 | End: 2017-04-07

## 2017-04-07 RX ORDER — ONDANSETRON 8 MG/1
8 TABLET, ORALLY DISINTEGRATING ORAL EVERY 8 HOURS PRN
Status: DISCONTINUED | OUTPATIENT
Start: 2017-04-07 | End: 2017-04-09 | Stop reason: HOSPADM

## 2017-04-07 RX ORDER — SODIUM CHLORIDE, SODIUM LACTATE, POTASSIUM CHLORIDE, CALCIUM CHLORIDE 600; 310; 30; 20 MG/100ML; MG/100ML; MG/100ML; MG/100ML
INJECTION, SOLUTION INTRAVENOUS CONTINUOUS
Status: DISCONTINUED | OUTPATIENT
Start: 2017-04-07 | End: 2017-04-09 | Stop reason: HOSPADM

## 2017-04-07 RX ORDER — AMMONIA 15 % (W/V)
0.3 AMPUL (EA) INHALATION CONTINUOUS PRN
Status: DISCONTINUED | OUTPATIENT
Start: 2017-04-07 | End: 2017-04-09 | Stop reason: HOSPADM

## 2017-04-07 RX ORDER — MAGNESIUM SULFATE HEPTAHYDRATE 40 MG/ML
2 INJECTION, SOLUTION INTRAVENOUS CONTINUOUS
Status: DISCONTINUED | OUTPATIENT
Start: 2017-04-07 | End: 2017-04-08

## 2017-04-07 RX ORDER — PROMETHAZINE HYDROCHLORIDE 25 MG/1
25 TABLET ORAL EVERY 6 HOURS PRN
Status: DISCONTINUED | OUTPATIENT
Start: 2017-04-07 | End: 2017-04-09 | Stop reason: HOSPADM

## 2017-04-07 RX ORDER — LIDOCAINE HYDROCHLORIDE AND EPINEPHRINE 15; 5 MG/ML; UG/ML
INJECTION, SOLUTION EPIDURAL
Status: DISCONTINUED | OUTPATIENT
Start: 2017-04-07 | End: 2017-04-07

## 2017-04-07 RX ORDER — ACETAMINOPHEN 325 MG/1
650 TABLET ORAL EVERY 6 HOURS PRN
Status: DISCONTINUED | OUTPATIENT
Start: 2017-04-07 | End: 2017-04-09 | Stop reason: HOSPADM

## 2017-04-07 RX ORDER — MISOPROSTOL 200 UG/1
600 TABLET ORAL
Status: DISCONTINUED | OUTPATIENT
Start: 2017-04-07 | End: 2017-04-09 | Stop reason: HOSPADM

## 2017-04-07 RX ORDER — DIPHENHYDRAMINE HCL 25 MG
25 CAPSULE ORAL EVERY 4 HOURS PRN
Status: DISCONTINUED | OUTPATIENT
Start: 2017-04-07 | End: 2017-04-09 | Stop reason: HOSPADM

## 2017-04-07 RX ORDER — ROPIVACAINE HYDROCHLORIDE 2 MG/ML
INJECTION, SOLUTION EPIDURAL; INFILTRATION; PERINEURAL
Status: DISCONTINUED | OUTPATIENT
Start: 2017-04-07 | End: 2017-04-07

## 2017-04-07 RX ORDER — DOCUSATE SODIUM 100 MG/1
100 CAPSULE, LIQUID FILLED ORAL DAILY
Status: DISCONTINUED | OUTPATIENT
Start: 2017-04-07 | End: 2017-04-09 | Stop reason: HOSPADM

## 2017-04-07 RX ORDER — OXYCODONE AND ACETAMINOPHEN 5; 325 MG/1; MG/1
1 TABLET ORAL EVERY 4 HOURS PRN
Status: DISCONTINUED | OUTPATIENT
Start: 2017-04-07 | End: 2017-04-09 | Stop reason: HOSPADM

## 2017-04-07 RX ORDER — OXYCODONE AND ACETAMINOPHEN 10; 325 MG/1; MG/1
1 TABLET ORAL EVERY 4 HOURS PRN
Status: DISCONTINUED | OUTPATIENT
Start: 2017-04-07 | End: 2017-04-09 | Stop reason: HOSPADM

## 2017-04-07 RX ORDER — PHENYLEPHRINE HYDROCHLORIDE 10 MG/ML
INJECTION INTRAVENOUS
Status: DISCONTINUED | OUTPATIENT
Start: 2017-04-07 | End: 2017-04-07

## 2017-04-07 RX ADMIN — LIDOCAINE HYDROCHLORIDE AND EPINEPHRINE 3 ML: 15; 5 INJECTION, SOLUTION EPIDURAL; INFILTRATION; INTRACAUDAL; PERINEURAL at 01:04

## 2017-04-07 RX ADMIN — IBUPROFEN 600 MG: 600 TABLET, FILM COATED ORAL at 11:04

## 2017-04-07 RX ADMIN — ROPIVACAINE HYDROCHLORIDE 14 ML: 2 INJECTION, SOLUTION EPIDURAL; INFILTRATION at 01:04

## 2017-04-07 RX ADMIN — ROPIVACAINE HYDROCHLORIDE 14 ML/HR: 2 INJECTION, SOLUTION EPIDURAL; INFILTRATION at 01:04

## 2017-04-07 RX ADMIN — SODIUM CHLORIDE, SODIUM LACTATE, POTASSIUM CHLORIDE, AND CALCIUM CHLORIDE: .6; .31; .03; .02 INJECTION, SOLUTION INTRAVENOUS at 02:04

## 2017-04-07 RX ADMIN — PHENYLEPHRINE HYDROCHLORIDE 100 MCG: 10 INJECTION INTRAVENOUS at 02:04

## 2017-04-07 RX ADMIN — MAGNESIUM SULFATE IN WATER 2 G/HR: 40 INJECTION, SOLUTION INTRAVENOUS at 02:04

## 2017-04-07 RX ADMIN — IBUPROFEN 600 MG: 600 TABLET, FILM COATED ORAL at 05:04

## 2017-04-07 RX ADMIN — Medication 2 MILLI-UNITS/MIN: at 12:04

## 2017-04-07 NOTE — LACTATION NOTE
Lactation Rounds:    Infant 4 hours old. Mother reports infant breast fed for 2 minutes after delivery. Infant swaddled in dads arms. Infant fussy, encouraged mother to attempt to latch infant to breast. Infant hypotonic. Mother on magnesium during and after delivery. Infant placed in football hold on right side. Breast compression and massage performed during feeding. Infant audible swallows noted. 22 mins of nutritive feeding with guidance. EBM obtained by hand expression bilaterally. Infant fed 3.4 mLs of colostrum after breastfeeding. Nipple WNL in appearance mother denies pain. Infant skin to skin, remains content no feeding cues noted. Encouraged to feed infant 8-12 times in 24 hours.Call lactation as needed for assistance.     Lactation packet given and admit information reviewed. Mother verbalizes understanding of expected  behaviors and output for the first 48 hours of life.  Discussed the importance of cue based feedings on demand, unrestricted access to the breast, and frequent uninterrupted skin to skin contact.  Risk and implications of artificial nipples and supplementation discussed.  Encouraged mother to call for assistance when desired or when infant is showing signs of hunger, contact number provided, mother verbalizes understanding.       17 1200   Maternal Infant Assessment   Breast Shape Bilateral:;round   Breast Density Bilateral:;soft   Areola Bilateral:;elastic   Infant Assessment   Sucking Reflex present   Swallow Reflex present   LATCH Score   Latch 2-->grasps breast, tongue down, lips flanged, rhythmic sucking   Audible Swallowing 2-->spontaneous and intermittent (24 hrs old)   Type Of Nipple 2-->everted (after stimulation)   Comfort (Breast/Nipple) 2-->soft/nontender   Hold (Positioning) 0-->full assist (staff holds infant at breast)   Score (less than 7 for 2/more consecutive times, consult Lactation Consultant) 8   Maternal Infant Feeding   Maternal Emotional State assist  "needed   Infant Positioning clutch/"football"   Signs of Milk Transfer infant jaw motion present;suck/swallow ratio;audible swallow   Presence of Pain no   Pain Location breasts, bilateral   Time Spent (min) 30-60 min   Nipple Shape After Feeding, Left wdl   Nipple Shape After Feeding, Right wdl   Latch Assistance yes   Engorgement Measures complete emptying encouraged   Breastfeeding Education adequate infant intake;importance of skin-to-skin contact   Feeding Infant   Satiety Cues sleeping after feeding;cessation of sucking;calm after feeding;infant's body extended;infant releases breast   Feeding Tolerance/Success sleepy;coordinated suck;coordinated swallow   Effective Latch During Feeding yes   Audible Swallow yes   Suck/Swallow Coordination present   Number of Sucks per Swallow (several)   Lactation Referrals   Lactation Consult Breastfeeding assessment   Lactation Interventions   Attachment Promotion skin-to-skin contact encouraged;rooming-in promoted;role responsibility promoted;privacy provided;infant-mother separation minimized;family involvement promoted;face-to-face positioning promoted;environment adjusted;counseling provided;breastfeeding assistance provided   Breast Care: Breastfeeding manual expression to soften breast;milk massaged towards nipple   Breastfeeding Assistance support offered;assisted with positioning;assisted with techniques for flat/inverted nipples;both breasts offered each feeding;feeding cue recognition promoted;feeding on demand promoted;feeding session observed;infant latch-on verified;infant suck/swallow verified;infant stimulated to wakeful state   Maternal Breastfeeding Support maternal rest encouraged;maternal nutrition promoted;maternal hydration promoted;lactation counseling provided;infant-mother separation minimized;encouragement offered;diary/feeding log utilized   Latch Promotion suck stimulated with colostrum drop;infant moved to breast;positioning assisted     "

## 2017-04-07 NOTE — PROGRESS NOTES
S: pt comfortable at present  O:  VS reviewed, afebrile   Vitals:    04/06/17 2342 04/06/17 2343 04/06/17 2348 04/06/17 2353   BP: (!) 143/70      Pulse: 80 80 81 78   Resp:       Temp:       TempSrc:       SpO2:  (!) 92% 95% (!) 92%   Weight:       Height:           FHTs Cat 1  UC Q 4-5 min SROM clear fluid  SVE deferred    A: IUP @ 38w1d ;     Patient Active Problem List   Diagnosis    Headache in pregnancy, antepartum    Severe pre-eclampsia in third trimester    Severe preeclampsia       P:   Continue close monitoring of maternal/fetal status, pitocin infusing

## 2017-04-07 NOTE — PLAN OF CARE
Problem: Patient Care Overview  Goal: Plan of Care Review  Outcome: Ongoing (interventions implemented as appropriate)  VSS. Bleeding light. Fundus firm. Mag at 2g/hr with lactated ringers at 75ml/hr. Denies headache and blurred vision. No clonus. Reflexes 2-3+. Swelling noted. SCDs on. Suction and oxygen set up. Bed rails padded. Pt denies pain at this time. Received scheduled motrin. Breast feeding with help. Family at bedside. Will continue to monitor.

## 2017-04-07 NOTE — LACTATION NOTE
"Lactation rounds  Called to room for latch assistance. First tried to latch infant in a football hold on the left breast. Both baby and mother seems uncomfortable. After numerous attempts, changed baby to the right side. Baby needed a lot of prompting at the breast and stimulation, finally opened the mouth and latched on,. Once latched on, baby needed a lot of simulation in order to settle into a nutritive pattern.   Baby fed for 11 minute, needed a lot of prompting, and unlatched, content.   Reviewed with mother hands expression. Mother's breasts are dense, and mother seems to have difficulties to compress adequately her glandular tissue (probably die to magnesium). Mother was getting upset, so attempt delayed- will try again this evening.   Baby still is showing low tone, mostly on the right arm. Primary nurse and nurse midwife updated on finding.     04/07/17 1715   Maternal Infant Assessment   Breast Size Issue none   Breast Shape Bilateral:;round   Breast Density Bilateral:;soft   Areola Bilateral:;elastic   Nipple(s) Bilateral:;graspable   Infant Assessment   Medical Condition other (see comments)  (low tone)   Skin Color pink, pale   LATCH Score   Latch 1-->repeated attempts, holds nipple in mouth, stimulate to suck   Audible Swallowing 1-->a few with stimulation   Type Of Nipple 2-->everted (after stimulation)   Comfort (Breast/Nipple) 2-->soft/nontender   Hold (Positioning) 0-->full assist (staff holds infant at breast)   Score (less than 7 for 2/more consecutive times, consult Lactation Consultant) 6   Maternal Infant Feeding   Maternal Preparation breast care   Maternal Emotional State assist needed   Infant Positioning clutch/"football"   Latch Assistance yes    Following Delivery yes   Feeding Infant   Feeding Readiness Cues smacking   Satiety Cues calm after feeding   Lactation Interventions   Attachment Promotion skin-to-skin contact encouraged;role responsibility promoted;privacy " provided;face-to-face positioning promoted;environment adjusted;breastfeeding assistance provided;counseling provided;family involvement promoted;infant-mother separation minimized;rooming-in promoted   Breast Care: Breastfeeding manual expression to soften breast;milk massaged towards nipple;lanolin to nipple(s) applied   Breastfeeding Assistance assisted with positioning;both breasts offered each feeding;feeding cue recognition promoted;feeding on demand promoted;feeding session observed;support offered;infant latch-on verified;infant suck/swallow verified   Maternal Breastfeeding Support lactation counseling provided;encouragement offered;infant-mother separation minimized   Latch Promotion positioning assisted

## 2017-04-07 NOTE — PLAN OF CARE
Problem: Patient Care Overview  Goal: Plan of Care Review  Outcome: Ongoing (interventions implemented as appropriate)  Discussed plan of care with patient. Pt wishing to rest and then have the baby. The pt requested delaying the beginning of pitocin.

## 2017-04-07 NOTE — PROGRESS NOTES
S:pt comfortable with epidural  O:  VS reviewed, afebrile   Vitals:    04/07/17 0253 04/07/17 0258 04/07/17 0301 04/07/17 0303   BP:   (!) 95/41    Pulse: 74 84 69 89   Resp:       Temp:       TempSrc:       SpO2: 100% 100%  100%   Weight:       Height:           FHTs 120 Cat 1  UC 2-5  SVE 7/90/-2 VTX    A: IUP @ 38w1d ;     Patient Active Problem List   Diagnosis    Headache in pregnancy, antepartum    Severe pre-eclampsia in third trimester    Severe preeclampsia       P:   Continue close monitoring of maternal/fetal status

## 2017-04-07 NOTE — ANESTHESIA PROCEDURE NOTES
Epidural    Patient location during procedure: OB   Reason for block: primary anesthetic   Diagnosis: labor   Start time: 4/7/2017 1:34 AM  Timeout: 4/7/2017 1:34 AM  End time: 4/7/2017 1:46 AM  Staffing  Anesthesiologist: ERWIN CRAIG  Resident/CRNA: MACKENZIE BROOKS  Performed by: resident/CRNA   Preanesthetic Checklist  Completed: patient identified, surgical consent, pre-op evaluation, timeout performed, IV checked, risks and benefits discussed, monitors and equipment checked, anesthesia consent given, hand hygiene performed and patient being monitored  Preparation  Patient position: sitting  Prep: Betadine  Patient monitoring: Pulse Ox and Blood Pressure  Epidural  Skin Anesthetic: lidocaine 1%  Skin Wheal: 3 mL  Administration type: continuous  Approach: midline  Interspace: L4-5  Injection technique: ERIK air  Needle and Epidural Catheter  Needle type: Tuohy   Needle gauge: 18  Needle length: 3.5 inches  Needle insertion depth: 8 cm  Catheter type: multi-orifice  Catheter size: 20 G  Catheter at skin depth: 13 cm  Test dose: 3 mL of lidocaine 1.5% with Epi 1-to-200,000  Additional Documentation: incremental injection, no signs/symptoms of IV or SA injection, negative aspiration for heme and CSF, no significant pain on injection, no paresthesia on injection and no significant complaints from patient  Needle localization: anatomical landmarks  Medications:  Bolus administered: 14 mL of 0.2% ropivacaine  Epinephrine added: none  Volume per aspiration: 3 mL  Time between aspirations: 0.5 minutes  Assessment  Upper dermatomal levels - Left: T10  Right: T10  Ease of block: easy  Patient's tolerance of the procedure: comfortable throughout block and no complaints

## 2017-04-07 NOTE — L&D DELIVERY NOTE
of viable male infant over intact perineum. Nuchal present and delivered through using somersault maneuver. Anterior shoulder delivered with gentle traction. Mouth and nose suctioned with bulb syringe. Infant to mother's abdomen. Delayed cord clamping for approx 3 min until cord done pulsating. Active management of third stage performed. Placenta delivered via Peterson. Fundus firm and bleeding normal.  ml.  Inspection of perineum reveals second degree midline lacerations. Repaired with 3.0 chromic. Mother and infant stable and remain skin to skin. Apgars 8/9. Measurements pending.       Delivery Information for  Ross Jo    Birth information:  YOB: 2019   Time of birth: 8:03 AM   Sex: male   Head Delivery Date/Time: 2017  8:03 AM   Delivery type: Vaginal, Spontaneous Delivery   Gestational Age: 38w1d    Delivery Providers    Delivering clinician:  WILLIAM QUEZADA   Other personnel:   Provider Role   MITCH MANCILLA, NYASIA MARTIN                        Assessment    Living status:  Yes   Apgars    1 Minute:   5 Minute:   10 Minute 15 Minute 20 Minute   Skin Color: 0  1       Heart Rate: 2  2       Reflex Irritability: 2  2       Muscle Tone: 2  2       Respiratory Effort: 2  2       Total: 8  9                         Assisted Delivery Details:    Forceps attempted?:  No   Vacuum extractor attempted?:  No             Shoulder Dystocia    Shoulder dystocia present?:  No                                             Presentation and Position    Presentation: Vertex   Position: Left    Occiput    Anterior               Interventions/Resuscitation         Cord    Vessels:  3 vessels   Complications:  Nuchal   Nuchal Cord Description:  tight nuchal cord   Number of Loops:  1   Delayed Cord Clamping?:  Yes   Cord Clamped Date/Time:  2017  8:06 AM   Cord Blood Disposition:  Lab   Gases Sent?:  No   Stem Cell Collection (by MD):  No        Placenta     Date and time:  2017  8:07 AM   Removal:  Spontaneous   Appearance:  Intact   Placenta disposition:  Discarded            Labor Events:       labor: No     Labor Onset Date/Time:         Dilation Complete Date/Time: 2017 06:15     Start Pushing Date/Time: 2017 07:35     Rupture Date/Time:              Rupture type:           Fluid Amount:        Fluid Color:        Fluid Odor:        Membrane Status (PeriCalm): SRM (Spontaneous Rupture)      Rupture Date/Time (PeriCalm): 2017 22:41:00      Fluid Amount (PeriCalm): Small      Fluid Color (PeriCalm): Clear       steroids: None     Antibiotics given for GBS: No     Induction: misoprostol     Indications for induction:  Mild Preeclampsia     Augmentation: oxytocin     Indications for augmentation:       Labor complications: None     Additional complications:          Cervical ripening:                     Delivery:      Episiotomy: None     Indication for Episiotomy:       Perineal Lacerations: 2nd Repaired:  Yes   Periurethral Laceration: none Repaired:     Labial Laceration: none Repaired:     Sulcus Laceration: none Repaired:     Vaginal Laceration: No Repaired:     Cervical Laceration: No Repaired:     Repair suture:       Repair # of packets: 2     Blood loss (ml): 100     Vaginal Sweep Performed: Yes     Surgicount Correct: Yes       Other providers:       Anesthesia    Method:  Epidural   Analgesics:   Analgesics   STADOL INJ                 Details (if applicable):  Trial of Labor      Categorization:      Priority:     Indications for :     Incision Type:       Additional  information:  Forceps:    Vacuum:    Breech:    Observed anomalies    Other (Comments):

## 2017-04-07 NOTE — PROGRESS NOTES
S: Comfortable with epidural  O:  VS reviewed, afebrile   Vitals:    04/07/17 0653 04/07/17 0658 04/07/17 0701 04/07/17 0703   BP:   112/60    Pulse: 81 77 79 83   Resp:       Temp:       TempSrc:       SpO2: 100% 100%  100%   Weight:       Height:         FHTs: Category 1  UC: every 2-3 minutes  SVE: 10/100/-1  Pitocin @ 6 mu/min    A: IUP @ 38w1d; pre-e IOL    Patient Active Problem List   Diagnosis    Headache in pregnancy, antepartum    Severe pre-eclampsia in third trimester    Severe preeclampsia     P: Labor down

## 2017-04-07 NOTE — ANESTHESIA POSTPROCEDURE EVALUATION
"Anesthesia Post Evaluation    Patient: Beth Jo    Procedure(s) Performed: * No procedures listed *    Final Anesthesia Type: epidural  Patient location during evaluation: labor & delivery  Patient participation: Yes- Able to Participate  Level of consciousness: awake and alert and oriented  Post-procedure vital signs: reviewed and stable  Pain management: adequate  Airway patency: patent  PONV status at discharge: No PONV  Anesthetic complications: no      Cardiovascular status: hemodynamically stable  Respiratory status: unassisted, room air and spontaneous ventilation  Hydration status: euvolemic  Follow-up not needed.        Visit Vitals    /60    Pulse 83    Temp 36.8 °C (98.3 °F) (Axillary)    Resp 18    Ht 5' 4" (1.626 m)    Wt 94.3 kg (208 lb)    SpO2 100%    Breastfeeding No    BMI 35.7 kg/m2       Pain/Homero Score: Pain Rating Prior to Med Admin: 8 (4/6/2017 11:30 PM)  Pain Rating Post Med Admin: 2 (4/7/2017 12:00 AM)      "

## 2017-04-07 NOTE — ANESTHESIA PREPROCEDURE EVALUATION
04/07/2017  Beth Jo is a 21 y.o., female.    [facility] Anesthesia Evaluation    I have reviewed the Patient Summary Reports.    I have reviewed the Nursing Notes.   I have reviewed the Medications.     Review of Systems  Anesthesia Hx:  No problems with previous Anesthesia  History of prior surgery of interest to airway management or planning: Previous anesthesia: General bilateral arms and tonsils with general anesthesia.  Denies Family Hx of Anesthesia complications.   Denies Personal Hx of Anesthesia complications.   Social:  No Alcohol Use, Non-Smoker    Hematology/Oncology:  Hematology Normal   Oncology Normal     EENT/Dental:EENT/Dental Normal   Cardiovascular:   Hypertension, poorly controlled PIH on Mag dripp   Pulmonary:  Pulmonary Normal    Neurological:   Headaches        Physical Exam  General:  Well nourished    Airway/Jaw/Neck:  Airway Findings: Mouth Opening: Normal Tongue: Normal  Mallampati: II  Improves to II with phonation.  TM Distance: Normal, at least 6 cm      Dental:  Dental Findings: In tact        Mental Status:  Mental Status Findings:  Cooperative         Anesthesia Plan  Type of Anesthesia, risks & benefits discussed:  Anesthesia Type:  epidural  Patient's Preference:   Intra-op Monitoring Plan:   Intra-op Monitoring Plan Comments:   Post Op Pain Control Plan:   Post Op Pain Control Plan Comments:   Induction:    Beta Blocker:  Patient is not currently on a Beta-Blocker (No further documentation required).       Informed Consent: Patient understands risks and agrees with Anesthesia plan.  Questions answered. Anesthesia consent signed with patient.  ASA Score: 2     Day of Surgery Review of History & Physical: I have interviewed and examined the patient. I have reviewed the patient's H&P dated:            Ready For Surgery From Anesthesia Perspective.

## 2017-04-07 NOTE — PROGRESS NOTES
Per Dr. Li decrease Mag to 1g/hr at 2000 (12hrs post delivery). Pt can take shower after 2 hours if blood pressures do not increase. If BP is greater than 140/90,  increase Mag to 2g/hr

## 2017-04-08 PROCEDURE — 25000003 PHARM REV CODE 250: Performed by: OBSTETRICS & GYNECOLOGY

## 2017-04-08 PROCEDURE — 11000001 HC ACUTE MED/SURG PRIVATE ROOM

## 2017-04-08 RX ORDER — MAGNESIUM SULFATE HEPTAHYDRATE 40 MG/ML
1 INJECTION, SOLUTION INTRAVENOUS CONTINUOUS
Status: ACTIVE | OUTPATIENT
Start: 2017-04-07 | End: 2017-04-08

## 2017-04-08 RX ADMIN — IBUPROFEN 600 MG: 600 TABLET, FILM COATED ORAL at 12:04

## 2017-04-08 RX ADMIN — DOCUSATE SODIUM 100 MG: 100 CAPSULE, LIQUID FILLED ORAL at 08:04

## 2017-04-08 RX ADMIN — IBUPROFEN 600 MG: 600 TABLET, FILM COATED ORAL at 05:04

## 2017-04-08 RX ADMIN — IBUPROFEN 600 MG: 600 TABLET, FILM COATED ORAL at 06:04

## 2017-04-08 NOTE — PROGRESS NOTES
"[facility]  Vaginal Delivery Progress Note  Obstetrics    SUBJECTIVE:     Beth Jo is a 21 y.o. female PPD #1 status post Spontaneous vaginal delivery at 38w1d in a pregnancy complicated by pre-eclampsia. Patient is doing well this morning. She denies nausea, vomiting, fever or chills. Patient reports mild abdominal pain that is adequately relieved by oral pain medications. Lochia is mild and stable. Patient is voiding without difficulty and ambulating with no difficulty. Patient does plan to breast feed.   Magnesium sulfate discontinued at 0800. Patient reports feeling well, "just sore." No HA, visual disturbances, epigastric pain.    OBJECTIVE:     Vital Signs Ranges:  Temp:  [98.2 °F (36.8 °C)-98.6 °F (37 °C)] 98.2 °F (36.8 °C)  Pulse:  [] 80  Resp:  [17-18] 18  SpO2:  [91 %-100 %] 99 %  BP: (108-146)/(57-87) 128/80    I/O (Last 24H):    Intake/Output Summary (Last 24 hours) at 17 0857  Last data filed at 17 0800   Gross per 24 hour   Intake           1487.5 ml   Output             4850 ml   Net          -3362.5 ml       Physical Exam:  General:    alert, appears stated age and cooperative           Abdomen:  normal findings: no organomegaly, soft, non-tender and symmetric   Uterine Size:  firm located at the umbilicus.   Incision:  n/a   Extremities:   no edema, redness or tenderness in the calves or thighs     Lab Review:   [unfilled]    ASSESSMENT:     Assessment:  Active Hospital Problems    Diagnosis    * (normal spontaneous vaginal delivery)    Outcome of delivery, single liveborn    Obstetrical laceration, second degree     Midline repaired with 3.0 chromic      Headache in pregnancy, antepartum    Severe pre-eclampsia in third trimester      PLAN:     Plan:  1. Postpartum care:   - Patient doing well. Continue routine management and advances.   - Continue PO pain meds. Pain well controlled.   - Encourage ambulation   - Lactation: consult prn    Disposition: " As patient meets milestones, will plan to discharge tomrorrow.

## 2017-04-08 NOTE — PROGRESS NOTES
Pt evaluated, requesting that magnesium be discontinued  10 hrs s/p  male infant  Denies headache, blurry vision  Refused singh catheter, urinary output adequate  bp stable since [id number]    A/p s/p   preE  Pt agreeable to decrease of Magnesium sulfate to 1gm/hr  Pt will be observed for 2 hrs after the decrease, if bp remains stable and   No headache, pt will be allowed to get up to bathroom for quit shower.    Anticipate that Magnesium will be then reduced to 0.5mg at 8 am and off by noon tomorrow.

## 2017-04-08 NOTE — NURSING
Patient assisted to the bathroom at this time without difficulty, Evin bottle used to clean perineum, Tucs and dermoplast used and patient using depends that she brought from home. Patient moving around bathroom without assistance, without difficulty. New green pads placed on bed. Educated and vaginal bleeding after birth and the use of evin bottle postpartum. Pt verbalizes understanding.

## 2017-04-08 NOTE — LACTATION NOTE
Lactation Rounds: Infant wt loss and output WNL. Mother reports infant remained sleepy with difficulty latching overnight, last feeding around 0500, infant required many attempts to maintain latch, continued to frequently pull away during feeding. Mother attempting to latch infant to left breast in football hold. Assisted with positioning and latch technique. Infant with weak suck noted, required much stimulation to remain interested in feeding. Mother independent with hand expression, large drops of colostrum easily expressed into infants mouth. After numerous attempts, infant finally obtained deep latch and settled into adequate suckling pattern. Assisted with infant chin support, breast massage and compression to keep infant interested. A few audible swallows noted, mother denies pain with latch. Infant noted to develop more effective and nutritive suckling pattern throughout feeding. Infant released breast, nipple slightly compressed. Assisted mother with positioning to right breast in football hold. Mother independently latched infant, infant obtained deep asymmetric latch, settled into nutritive feeding with audible swallows. Mother independently performed breast massage and compression throughout feeding. Infant released breast, nipple shape WNL upon unlatching.  Reviewed expected  behaviors and output for first 48 hours of life, specifically cluster feeding. Mother to call for assistance as needed.     17 0935   Maternal Infant Assessment   Additional Documentation LATCH Score (Group)   Infant Assessment   Weight Loss (%) 2.8   Number of Stools (24 hours) 2   Number of Voids (24 hours) 1   LATCH Score   Latch 1-->repeated attempts, holds nipple in mouth, stimulate to suck   Audible Swallowing 2-->spontaneous and intermittent (24 hrs old)   Type Of Nipple 2-->everted (after stimulation)   Comfort (Breast/Nipple) 2-->soft/nontender   Hold (Positioning) 1-->minimal assist, teach one side: mother  "does other, staff holds   Score (less than 7 for 2/more consecutive times, consult Lactation Consultant) 8   Maternal Infant Feeding   Infant Positioning clutch/"football"   Signs of Milk Transfer audible swallow;infant jaw motion present   Nipple Shape After Feeding, Left minimal compression   Nipple Shape After Feeding, Right WNL   Latch Assistance yes   Feeding Infant   Effective Latch During Feeding yes   Audible Swallow yes   Lactation Interventions   Attachment Promotion breastfeeding assistance provided;counseling provided;skin-to-skin contact encouraged;rooming-in promoted;infant-mother separation minimized;privacy provided   Breastfeeding Assistance assisted with positioning;both breasts offered each feeding;feeding cue recognition promoted;feeding on demand promoted;feeding session observed;infant latch-on verified;infant suck/swallow verified;support offered   Maternal Breastfeeding Support encouragement offered;lactation counseling provided   Latch Promotion positioning assisted;infant moved to breast;suck stimulated with colostrum drop     "

## 2017-04-08 NOTE — PLAN OF CARE
Problem: Patient Care Overview  Goal: Interdisciplinary Rounds/Family Conf  Outcome: Ongoing (interventions implemented as appropriate)  VVS. Viable patient. Lochia rubra and light. Fundus under umbilicus. Pain well controlled with p.o. Medications. Breast feeding. Mother and infant bonding well. Progress will continue to be monitored.

## 2017-04-08 NOTE — LACTATION NOTE
"Lactation called to room for latch assistance. Upon entering room, infant skin to skin with mother, sleeping. Mother states infant has been sleepy despite numerous attempts to wake and latch, last feeding 0930. Assisted with attempts to wake infant, infant remains sleepy despite attempts. Infant finally placed in bassinet in diaper, with blanket lightly over. After a few minutes, infant now awake, exhibiting some feeding cues. Mother independently positioned infant to right breast in football hold, attempted to latch. Infant having difficulty maintaining deep latch. Suck training completed, infant now able to settle into longer suckling pattern. Assisted with latch utilizing "teacup" technique, described to parents for future reference as needed. Infant obtained deep asymmetric latch, slightly weak suck noted. After chin support and breast massage and compression, infant more actively feeding, audible swallows noted. After many minutes and audible swallows, infant released breast, content, nipple shape WNL upon unlatching.  Assisted mother with positioning infant to left breast in cross cradle hold, mother able to demonstrate deep latch technique. Infant sleepy, no attempts to latch.  Mother continues to require reinforcement/assistance with latch technique. Infant remains sleepy, anticipate cluster feeding tonight.  Anticipatory guidance provided, encouraged mother to hand express and syringe EBM prior to latch attempts if infant sleepy in attempts to increase wakefulness. Utilize suck training before feedings and when infant awake.  Encouraged mother to consider pumping tonight if infant remains sleepy and does not wake to cluster feed as expected to protect supply.   Mother in agreement with plan, will call for assistance as needed.  "

## 2017-04-08 NOTE — PLAN OF CARE
Problem: Patient Care Overview  Goal: Plan of Care Review  Outcome: Ongoing (interventions implemented as appropriate)  Discussed with Dr. Li and patient about decreasing the magnesium tonight. Dr. Li did allow the patient to take a quick shower at 2200. Pt was able to get cleaned up and is back in bed. Pt understands the magnesium will continue through the night. The patient has no complaints.

## 2017-04-08 NOTE — LACTATION NOTE
Lactation rounds  Mother is currently attempting to latch baby in a football hold on the right breast with her primary nurse. Attempt is not successful, despite good positioning. Baby is sometimes able to latch on, but does not follow with appropriate sucking motion. Upon suck assessment, baby is tongue trusting, very disorganized suck.   Helped mother to manually express some colostrum. Mother is stronger, and able to perform hand expression a little bit better this time.   Attempted to syringe feed [place], but baby is now sleepy and not performing suckling at all. Attempt at feeding baby delayed, will feed baby later.   Reviewed POC with mother and primary nurse; will attempt breastfeeding, if not successful, hand express and syringe feed baby.   Mother verbalized understanding.

## 2017-04-09 VITALS
SYSTOLIC BLOOD PRESSURE: 130 MMHG | HEIGHT: 64 IN | TEMPERATURE: 98 F | HEART RATE: 73 BPM | BODY MASS INDEX: 35.51 KG/M2 | DIASTOLIC BLOOD PRESSURE: 74 MMHG | WEIGHT: 208 LBS | RESPIRATION RATE: 18 BRPM | OXYGEN SATURATION: 94 %

## 2017-04-09 PROCEDURE — 25000003 PHARM REV CODE 250: Performed by: OBSTETRICS & GYNECOLOGY

## 2017-04-09 RX ADMIN — DOCUSATE SODIUM 100 MG: 100 CAPSULE, LIQUID FILLED ORAL at 08:04

## 2017-04-09 RX ADMIN — IBUPROFEN 600 MG: 600 TABLET, FILM COATED ORAL at 12:04

## 2017-04-09 RX ADMIN — IBUPROFEN 600 MG: 600 TABLET, FILM COATED ORAL at 06:04

## 2017-04-09 NOTE — DISCHARGE SUMMARY
[facility]  Delivery Discharge Summary  Obstetrics    Admit Date: 2017    Discharge Date and Time: 2017    Primary OB Clinician: [facility]    Attending Physician: Anum Patel MD     Discharge Provider: Chidi Godwin    Reason for Admission: Induction    Estimated Date of Delivery: 17     Conditions: Hypertension: Severe Preeclampsia    Procedures Performed: * No surgery found *    Beth Jo is a 21 y.o. now , PPD #2 who was admitted on 2017  1:30 AM for normal spontaneous vaginal delivery. Labor course was adequate.  Patient delivered a single viable  male. Pt was in stable condition post-delivery and was transferred to the Mother-Baby Unit. Her postpartum course was uncomplicated. On discharge day, patient's pain is controlled with oral pain medications. Patient is tolerating PO without nausea or vomiting, ambulating, voiding. She denies SOB and CP. Denies any HA, vision changes, F/C, LE swelling. Denies any breast pain/soreness.     Delivery:    Episiotomy: None   Lacerations: 2nd   Repair suture:     Repair # of packets: 2   Blood loss (ml): 100     Birth information:  YOB: 2019   Time of birth: 8:03 AM   Sex: male   Delivery type: Vaginal, Spontaneous Delivery   Gestational Age: 38w1d    Delivery Clinician:      Other providers:       Additional  information:  Forceps:    Vacuum:    Breech:    Observed anomalies      Living?:           APGARS  One minute Five minutes Ten minutes   Skin color:         Heart rate:         Grimace:         Muscle tone:         Breathing:         Totals: 8  9        Placenta: Delivered:       appearance    Tubal Ligation: n/a    Feeding Method:  the patient is currently breastfeeding.    Immunization Hx:  There is no immunization history for the selected administration types on file for this patient.      Final Diagnoses:   Principal Problem:  (normal spontaneous vaginal  delivery)   Secondary Diagnoses:   Active Hospital Problems    Diagnosis  POA    * (normal spontaneous vaginal delivery) [O80]  Not Applicable    Outcome of delivery, single liveborn [Z37.0]  Not Applicable    Obstetrical laceration, second degree [O70.1]  No     Midline repaired with 3.0 chromic      Headache in pregnancy, antepartum [O26.899, R51]  Yes    Severe pre-eclampsia in third trimester [O14.13]  Yes      Resolved Hospital Problems    Diagnosis Date Resolved POA    Severe preeclampsia [O14.10] 2017 Yes       Discharged Condition: good    Disposition: Home or Self Care    Follow Up/Patient Instructions:     Medications:   Beth Jo   Home Medication Instructions DANII:[id number]    Printed on:17 0834   Medication Information                      multivitamin capsule  Take 1 capsule by mouth once daily.               No discharge procedures on file.  Follow-up Information     Follow up In 1 week.    Why:  BP check w/ [facility]

## 2017-04-09 NOTE — PLAN OF CARE
VVS. Viable patient. Pain well controlled with p.o. Medication. Fundus below umbilicus. Lochia rubra and light per patient report. Breast feeding infant. Patient educated on discharge teaching. verbal agreement of understanding of discharge teaching received. Mother and infant bonding well. Pt discharged via wheel chair with infant in lap.

## 2017-04-09 NOTE — PLAN OF CARE
Problem: Patient Care Overview  Goal: Plan of Care Review  Outcome: Ongoing (interventions implemented as appropriate)  Pt progressing well. No issues noted currently. Mild cramping relieved with po motrin. Fundus firm with light lochia. Ambulating and voiding without difficulty. Family at bedside. Vitals stable. Will continue to monitor.

## 2017-04-09 NOTE — LACTATION NOTE
Lactation Rounds: Infant wt loss and output WNL. Mother states infant feedings improved overnight, however infant continues to require stimulation to wake and continue feeding. Mother states she feels comfortable in positioning and latching infant to both breasts, denies pain associated with latch, reports audible swallows throughout feeding. Mother and father anxious for discharge, decline feeding assessment prior to discharge despite encouragement.     Lactation discharge information reviewed.  Mother is aware of warm line, and outpatient consultations and monthly support gatherings. Encouraged mother to contact lactation with any questions, concerns, or problems. Contact numbers provided, and mother verbalizes understanding.       04/09/17 0945   Infant Assessment   Weight Loss (%) 4.2   Number of Stools (24 hours) 5   Number of Voids (24 hours) 4   Lactation Interventions   Attachment Promotion counseling provided;rooming-in promoted;skin-to-skin contact encouraged;infant-mother separation minimized;privacy provided   Breastfeeding Assistance both breasts offered each feeding;feeding cue recognition promoted;feeding on demand promoted;support offered   Maternal Breastfeeding Support encouragement offered;lactation counseling provided

## 2017-04-09 NOTE — DISCHARGE INSTRUCTIONS
Mother Self Care:    Activity: Avoid strenuous exercise and get adequate rest.  No driving until your physician gives you consent.  Emotional Changes: The grieving process has many different stages, be prepared to experience lots of emotional ups and downs. Identify people to be your support system, and do not hesitate to call our  if you need someone to talk to.   Breast Care: You may notice milk leaking from your breasts. Wear a support bra 24 hours a day for one week or wrap breasts in an ace bandage if needed to stop milk production.  Avoid stimulation to breasts.  You may use ice packs for discomfort.  Evin-Care/Vaginal Bleeding: Remember to use your evin-bottle after urinating.  Your flow will change from red, to pink, to yellow/white color over a period of 2 weeks.  Menstruation will return in 3-8 weeks.  Episiotomy Vaginal Delivery: Stitches will dissolve within 10 days to 3 weeks.  Warm baths, tucks, and dermoplast spray will promote healing.  Avoid bubble baths or strong soaps.   Section/Tubal Ligation: Keep incision clean and dry.  Please remove steri-strips in 5-7 days.  You may shower, but avoid baths.  Sexual Activity/Pelvic Rest: No sexual activity, tampons, or douching until your physician gives you consent.  Diet: Continue to eat from the five basic food groups, including plenty of protein, fruits, vegetables, and whole grains.  Limit empty calories and high fat foods.  Drink enough fluids to satisfy thirst.  Constipation/Hemorrhoids: Drink plenty of water.  You may take a stool softener or natural laxative (Metamucil). You may use tucks or hemorrhoid ointment and soak in a warm tub.    CALL YOUR OB DOCTOR IF ANY OF THE FOLLOWING OCCURS:  *Heavy bleeding - saturating a pad an hour or passing any large (2-3 inches in size) blood clots.  *Any pain, redness, or tenderness in lower leg.  *You cannot care for yourself  *Any signs of infection-      - Temperature greater than 100.5  degrees F      - Foul smelling vaginal discharge and/or incisional drainage      - Increased episiotomy or incisional pain      - Hot, hard, red or sore area on breast      - Flu-like symptoms      - Any urgency, frequency or burning with urination

## 2017-04-17 ENCOUNTER — CLINICAL SUPPORT (OUTPATIENT)
Dept: OBSTETRICS AND GYNECOLOGY | Facility: CLINIC | Age: 22
End: 2017-04-17
Payer: OTHER GOVERNMENT

## 2017-04-17 NOTE — PROGRESS NOTES
Patient came in for blood pressure check. B/p 124/70 Pt. Reports no headaches, no dizziness and no blurred vision. Spoke with Dr. Patel who said patient needs to keep pp follow up visit. Informed patient pt. Voiced understanding.

## 2018-05-29 ENCOUNTER — OFFICE VISIT (OUTPATIENT)
Dept: OBSTETRICS AND GYNECOLOGY | Facility: CLINIC | Age: 23
End: 2018-05-29
Payer: MEDICAID

## 2018-05-29 ENCOUNTER — LAB VISIT (OUTPATIENT)
Dept: LAB | Facility: HOSPITAL | Age: 23
End: 2018-05-29
Attending: ADVANCED PRACTICE MIDWIFE
Payer: MEDICAID

## 2018-05-29 VITALS
SYSTOLIC BLOOD PRESSURE: 118 MMHG | WEIGHT: 192 LBS | BODY MASS INDEX: 32.78 KG/M2 | DIASTOLIC BLOOD PRESSURE: 70 MMHG | HEIGHT: 64 IN

## 2018-05-29 DIAGNOSIS — O26.841 UTERINE SIZE-DATE DISCREPANCY IN FIRST TRIMESTER: ICD-10-CM

## 2018-05-29 DIAGNOSIS — Z32.00 ENCOUNTER FOR CONFIRMATION OF PREGNANCY TEST RESULT WITH PHYSICAL EXAMINATION: Primary | ICD-10-CM

## 2018-05-29 DIAGNOSIS — Z87.59 HISTORY OF PRE-ECLAMPSIA: ICD-10-CM

## 2018-05-29 DIAGNOSIS — Z32.00 ENCOUNTER FOR CONFIRMATION OF PREGNANCY TEST RESULT WITH PHYSICAL EXAMINATION: ICD-10-CM

## 2018-05-29 PROBLEM — O26.899 HEADACHE IN PREGNANCY, ANTEPARTUM: Status: RESOLVED | Noted: 2017-04-05 | Resolved: 2018-05-29

## 2018-05-29 PROBLEM — R51.9 HEADACHE IN PREGNANCY, ANTEPARTUM: Status: RESOLVED | Noted: 2017-04-05 | Resolved: 2018-05-29

## 2018-05-29 PROBLEM — O14.13 SEVERE PRE-ECLAMPSIA IN THIRD TRIMESTER: Status: RESOLVED | Noted: 2017-04-05 | Resolved: 2018-05-29

## 2018-05-29 LAB
ABO + RH BLD: NORMAL
ANION GAP SERPL CALC-SCNC: 7 MMOL/L
BASOPHILS # BLD AUTO: 0.04 K/UL
BASOPHILS NFR BLD: 0.6 %
BLD GP AB SCN CELLS X3 SERPL QL: NORMAL
BUN SERPL-MCNC: 8 MG/DL
CALCIUM SERPL-MCNC: 8.7 MG/DL
CHLORIDE SERPL-SCNC: 106 MMOL/L
CO2 SERPL-SCNC: 25 MMOL/L
CREAT SERPL-MCNC: 0.7 MG/DL
DIFFERENTIAL METHOD: ABNORMAL
EOSINOPHIL # BLD AUTO: 0.1 K/UL
EOSINOPHIL NFR BLD: 1.1 %
ERYTHROCYTE [DISTWIDTH] IN BLOOD BY AUTOMATED COUNT: 13.5 %
EST. GFR  (AFRICAN AMERICAN): >60 ML/MIN/1.73 M^2
EST. GFR  (NON AFRICAN AMERICAN): >60 ML/MIN/1.73 M^2
GLUCOSE SERPL-MCNC: 88 MG/DL
HCT VFR BLD AUTO: 37.8 %
HGB BLD-MCNC: 12.3 G/DL
IMM GRANULOCYTES # BLD AUTO: 0.01 K/UL
IMM GRANULOCYTES NFR BLD AUTO: 0.1 %
LYMPHOCYTES # BLD AUTO: 1.9 K/UL
LYMPHOCYTES NFR BLD: 27.9 %
MCH RBC QN AUTO: 29.4 PG
MCHC RBC AUTO-ENTMCNC: 32.5 G/DL
MCV RBC AUTO: 90 FL
MONOCYTES # BLD AUTO: 0.6 K/UL
MONOCYTES NFR BLD: 7.9 %
NEUTROPHILS # BLD AUTO: 4.3 K/UL
NEUTROPHILS NFR BLD: 62.4 %
NRBC BLD-RTO: 0 /100 WBC
PLATELET # BLD AUTO: 393 K/UL
PMV BLD AUTO: 11 FL
POTASSIUM SERPL-SCNC: 4 MMOL/L
RBC # BLD AUTO: 4.18 M/UL
SODIUM SERPL-SCNC: 138 MMOL/L
WBC # BLD AUTO: 6.96 K/UL

## 2018-05-29 PROCEDURE — 86703 HIV-1/HIV-2 1 RESULT ANTBDY: CPT

## 2018-05-29 PROCEDURE — 99204 OFFICE O/P NEW MOD 45 MIN: CPT | Mod: S$PBB,TH,, | Performed by: ADVANCED PRACTICE MIDWIFE

## 2018-05-29 PROCEDURE — 80048 BASIC METABOLIC PNL TOTAL CA: CPT

## 2018-05-29 PROCEDURE — 86592 SYPHILIS TEST NON-TREP QUAL: CPT

## 2018-05-29 PROCEDURE — 99999 PR PBB SHADOW E&M-EST. PATIENT-LVL II: CPT | Mod: PBBFAC,,, | Performed by: ADVANCED PRACTICE MIDWIFE

## 2018-05-29 PROCEDURE — 87086 URINE CULTURE/COLONY COUNT: CPT

## 2018-05-29 PROCEDURE — 85025 COMPLETE CBC W/AUTO DIFF WBC: CPT

## 2018-05-29 PROCEDURE — 87340 HEPATITIS B SURFACE AG IA: CPT

## 2018-05-29 PROCEDURE — 99212 OFFICE O/P EST SF 10 MIN: CPT | Mod: PBBFAC,TH,PO | Performed by: ADVANCED PRACTICE MIDWIFE

## 2018-05-29 PROCEDURE — 36415 COLL VENOUS BLD VENIPUNCTURE: CPT | Mod: PO

## 2018-05-29 PROCEDURE — 87491 CHLMYD TRACH DNA AMP PROBE: CPT

## 2018-05-29 PROCEDURE — 86762 RUBELLA ANTIBODY: CPT

## 2018-05-29 PROCEDURE — 86850 RBC ANTIBODY SCREEN: CPT

## 2018-05-29 NOTE — PROGRESS NOTES
"CC: pregnancy risk assessment    Beth Jo is a 22 y.o. female  presents for a pregnancy risk assessment.  LMP: No LMP recorded (lmp unknown).. Currently breastfeeding.  No issues, problems, or complaints. Delivered her last baby with us. Had prenatal care at [facility], but risked out secondary to pre eclampsia.      Past Medical History:   Diagnosis Date    Pre-eclampsia      History reviewed. No pertinent surgical history.  Social History     Social History    Marital status:      Spouse name: N/A    Number of children: N/A    Years of education: N/A     Social History Main Topics    Smoking status: Never Smoker    Smokeless tobacco: None    Alcohol use No    Drug use: No    Sexual activity: Yes     Partners: Male     Other Topics Concern    None     Social History Narrative    None     Family History   Problem Relation Age of Onset    Hypertension Mother      OB History      Para Term  AB Living    1 1 1     1    SAB TAB Ectopic Multiple Live Births          0 1          /70   Ht 5' 4" (1.626 m)   Wt 87.1 kg (192 lb 0.3 oz)   LMP  (LMP Unknown)   Breastfeeding? Yes   BMI 32.96 kg/m²           ROS:  GENERAL: Denies weight gain or weight loss. Feeling well overall.   SKIN: Denies rash or lesions.   HEAD: Denies head injury or headache.   NODES: Denies enlarged lymph nodes.   CHEST: Denies chest pain or shortness of breath.   CARDIOVASCULAR: Denies palpitations or left sided chest pain.   ABDOMEN: No abdominal pain, constipation, diarrhea, nausea, vomiting or rectal bleeding.   URINARY: No frequency, dysuria, hematuria, or burning on urination.  REPRODUCTIVE: See HPI.   BREASTS: The patient performs breast self-examination and denies pain, lumps, or nipple discharge.   HEMATOLOGIC: No easy bruisability or excessive bleeding.   MUSCULOSKELETAL: Denies joint pain or swelling.   NEUROLOGIC: Denies syncope or weakness.   PSYCHIATRIC: Denies " depression, anxiety or mood swings.    PHYSICAL EXAM:    APPEARANCE: Well nourished, well developed, in no acute distress.  AFFECT: WNL, alert and oriented x 3  SKIN: No acne or hirsutism  NECK: Neck symmetric without masses or thyromegaly  NODES: No inguinal, cervical, axillary, or femoral lymph node enlargement  CHEST: Good respiratory effect  ABDOMEN: Soft.  No tenderness or masses.  No hepatosplenomegaly.  No hernias.  PELVIC: Normal external genitalia without lesions.  Normal hair distribution.  Adequate perineal body, normal urethral meatus.  Vagina moist and well rugated without lesions or discharge.  Cervix pink, without lesions, discharge or tenderness.  No significant cystocele or rectocele.  Bimanual exam shows uterus to be normal size, regular, mobile and nontender.  Adnexa without masses or tenderness.    EXTREMITIES: No edema.    1. Encounter for confirmation of pregnancy test result with physical examination  Rubella antibody, IgG    HIV-1 and HIV-2 antibodies    Basic metabolic panel    RPR    C. trachomatis/N. gonorrhoeae by AMP DNA Cervix    Hepatitis B surface antigen    Type & Screen - Ob Profile    CBC auto differential    Urine culture   2. History of pre-eclampsia     3. Uterine size-date discrepancy in first trimester  US OB/GYN Procedure ([facility])     PLAN:  New ob labs today  Dating u/s and new ob 2-3 weeks

## 2018-05-30 LAB
BACTERIA UR CULT: NORMAL
BACTERIA UR CULT: NORMAL
C TRACH DNA SPEC QL NAA+PROBE: NOT DETECTED
HBV SURFACE AG SERPL QL IA: NEGATIVE
HIV 1+2 AB+HIV1 P24 AG SERPL QL IA: NEGATIVE
N GONORRHOEA DNA SPEC QL NAA+PROBE: NOT DETECTED
RPR SER QL: NORMAL
RUBV IGG SER-ACNC: 25.2 IU/ML
RUBV IGG SER-IMP: REACTIVE

## 2018-06-15 ENCOUNTER — PROCEDURE VISIT (OUTPATIENT)
Dept: OBSTETRICS AND GYNECOLOGY | Facility: CLINIC | Age: 23
End: 2018-06-15
Payer: MEDICAID

## 2018-06-15 ENCOUNTER — INITIAL PRENATAL (OUTPATIENT)
Dept: OBSTETRICS AND GYNECOLOGY | Facility: CLINIC | Age: 23
End: 2018-06-15
Payer: MEDICAID

## 2018-06-15 VITALS — DIASTOLIC BLOOD PRESSURE: 80 MMHG | BODY MASS INDEX: 33.98 KG/M2 | WEIGHT: 198 LBS | SYSTOLIC BLOOD PRESSURE: 124 MMHG

## 2018-06-15 DIAGNOSIS — O26.841 UTERINE SIZE-DATE DISCREPANCY IN FIRST TRIMESTER: ICD-10-CM

## 2018-06-15 DIAGNOSIS — Z87.59 HISTORY OF PRE-ECLAMPSIA: Primary | ICD-10-CM

## 2018-06-15 DIAGNOSIS — Z34.81 SUPERVISION OF NORMAL INTRAUTERINE PREGNANCY IN MULTIGRAVIDA IN FIRST TRIMESTER: ICD-10-CM

## 2018-06-15 PROCEDURE — 76801 OB US < 14 WKS SINGLE FETUS: CPT | Mod: PBBFAC | Performed by: OBSTETRICS & GYNECOLOGY

## 2018-06-15 PROCEDURE — 99213 OFFICE O/P EST LOW 20 MIN: CPT | Mod: TH,S$PBB,, | Performed by: ADVANCED PRACTICE MIDWIFE

## 2018-06-15 PROCEDURE — 99999 PR PBB SHADOW E&M-EST. PATIENT-LVL II: CPT | Mod: PBBFAC,,, | Performed by: ADVANCED PRACTICE MIDWIFE

## 2018-06-15 PROCEDURE — 99212 OFFICE O/P EST SF 10 MIN: CPT | Mod: PBBFAC,TH,25 | Performed by: ADVANCED PRACTICE MIDWIFE

## 2018-06-15 PROCEDURE — 76801 OB US < 14 WKS SINGLE FETUS: CPT | Mod: 26,S$PBB,, | Performed by: OBSTETRICS & GYNECOLOGY

## 2018-06-15 NOTE — PROGRESS NOTES
"Familiar with our practice, delivered last baby with us, desires minimal intervetions, NCB, tub birth this round. Last birth IOL and Magnesium sulfate d/t pre E. Baby ASA recommendation made, on PNV with folate. "tons of nausea, no vomiting though, food eversions keyshawn with meal preparations" rec eat before cooking meals for others, possibly PB&J, see if s/s improve. Went thru chart review with pt and discussed last labor and delivery process, happy with her outcome, just disappointed in need for pitocin and singh catheter.  her son, he is 14 mos old, active. al  "

## 2018-07-17 ENCOUNTER — ROUTINE PRENATAL (OUTPATIENT)
Dept: OBSTETRICS AND GYNECOLOGY | Facility: CLINIC | Age: 23
End: 2018-07-17
Payer: MEDICAID

## 2018-07-17 VITALS — WEIGHT: 198 LBS | BODY MASS INDEX: 33.98 KG/M2 | DIASTOLIC BLOOD PRESSURE: 62 MMHG | SYSTOLIC BLOOD PRESSURE: 108 MMHG

## 2018-07-17 DIAGNOSIS — Z34.81 SUPERVISION OF NORMAL INTRAUTERINE PREGNANCY IN MULTIGRAVIDA IN FIRST TRIMESTER: Primary | ICD-10-CM

## 2018-07-17 DIAGNOSIS — Z87.59 HISTORY OF PRE-ECLAMPSIA: ICD-10-CM

## 2018-07-17 PROCEDURE — 99999 PR PBB SHADOW E&M-EST. PATIENT-LVL II: CPT | Mod: PBBFAC,,, | Performed by: ADVANCED PRACTICE MIDWIFE

## 2018-07-17 PROCEDURE — 99212 OFFICE O/P EST SF 10 MIN: CPT | Mod: PBBFAC,TH | Performed by: ADVANCED PRACTICE MIDWIFE

## 2018-07-17 PROCEDURE — 99213 OFFICE O/P EST LOW 20 MIN: CPT | Mod: TH,S$PBB,, | Performed by: ADVANCED PRACTICE MIDWIFE

## 2018-07-17 NOTE — PROGRESS NOTES
No change in wt but no loss. States still nausea, no vomiting, occas dizzy spells. Eating small, freq meals. Reassured some s/s may continue until 14 wks. al

## 2018-08-10 ENCOUNTER — TELEPHONE (OUTPATIENT)
Dept: OBSTETRICS AND GYNECOLOGY | Facility: CLINIC | Age: 23
End: 2018-08-10

## 2018-08-10 NOTE — TELEPHONE ENCOUNTER
----- Message from Luis Carlos Jenkins sent at 8/10/2018  9:46 AM CDT -----  Contact: pt   Pt is requesting a call back from the nurse in regards to getting a ultrasound for a gender reveal  [phone] (home)

## 2018-08-15 ENCOUNTER — ROUTINE PRENATAL (OUTPATIENT)
Dept: OBSTETRICS AND GYNECOLOGY | Facility: CLINIC | Age: 23
End: 2018-08-15
Payer: MEDICAID

## 2018-08-15 VITALS
WEIGHT: 203.06 LBS | BODY MASS INDEX: 34.85 KG/M2 | DIASTOLIC BLOOD PRESSURE: 72 MMHG | SYSTOLIC BLOOD PRESSURE: 122 MMHG

## 2018-08-15 DIAGNOSIS — Z36.89 ENCOUNTER FOR FETAL ANATOMIC SURVEY: ICD-10-CM

## 2018-08-15 DIAGNOSIS — Z87.59 HISTORY OF PRE-ECLAMPSIA: ICD-10-CM

## 2018-08-15 DIAGNOSIS — Z34.81 SUPERVISION OF NORMAL INTRAUTERINE PREGNANCY IN MULTIGRAVIDA IN FIRST TRIMESTER: Primary | ICD-10-CM

## 2018-08-15 PROCEDURE — 99212 OFFICE O/P EST SF 10 MIN: CPT | Mod: PBBFAC,TH | Performed by: ADVANCED PRACTICE MIDWIFE

## 2018-08-15 PROCEDURE — 99999 PR PBB SHADOW E&M-EST. PATIENT-LVL II: CPT | Mod: PBBFAC,,, | Performed by: ADVANCED PRACTICE MIDWIFE

## 2018-08-15 PROCEDURE — 99213 OFFICE O/P EST LOW 20 MIN: CPT | Mod: TH,S$PBB,, | Performed by: ADVANCED PRACTICE MIDWIFE

## 2018-08-15 NOTE — PROGRESS NOTES
Doing well, eating better. Will start baby ASA today. QMS today, anatomy scan next OV. Will do alternative to glucola, will also order baseline pre E labs at 26-28 wks. Pt anxious about pre E with this preg, reassured, suggested pt to monitor BP at home so she will notice changes more quickly. al

## 2018-09-13 ENCOUNTER — PROCEDURE VISIT (OUTPATIENT)
Dept: OBSTETRICS AND GYNECOLOGY | Facility: CLINIC | Age: 23
End: 2018-09-13
Payer: MEDICAID

## 2018-09-13 ENCOUNTER — ROUTINE PRENATAL (OUTPATIENT)
Dept: OBSTETRICS AND GYNECOLOGY | Facility: CLINIC | Age: 23
End: 2018-09-13
Payer: MEDICAID

## 2018-09-13 VITALS
BODY MASS INDEX: 35.72 KG/M2 | SYSTOLIC BLOOD PRESSURE: 118 MMHG | DIASTOLIC BLOOD PRESSURE: 70 MMHG | WEIGHT: 208.13 LBS

## 2018-09-13 DIAGNOSIS — Z36.89 ENCOUNTER FOR FETAL ANATOMIC SURVEY: ICD-10-CM

## 2018-09-13 DIAGNOSIS — Z34.81 SUPERVISION OF NORMAL INTRAUTERINE PREGNANCY IN MULTIGRAVIDA IN FIRST TRIMESTER: Primary | ICD-10-CM

## 2018-09-13 PROCEDURE — 76805 OB US >/= 14 WKS SNGL FETUS: CPT | Mod: 26,S$PBB,, | Performed by: OBSTETRICS & GYNECOLOGY

## 2018-09-13 PROCEDURE — 76805 OB US >/= 14 WKS SNGL FETUS: CPT | Mod: PBBFAC | Performed by: OBSTETRICS & GYNECOLOGY

## 2018-09-13 PROCEDURE — 99213 OFFICE O/P EST LOW 20 MIN: CPT | Mod: TH,S$PBB,25, | Performed by: ADVANCED PRACTICE MIDWIFE

## 2018-09-13 PROCEDURE — 99212 OFFICE O/P EST SF 10 MIN: CPT | Mod: PBBFAC,TH,25 | Performed by: ADVANCED PRACTICE MIDWIFE

## 2018-09-13 PROCEDURE — 99999 PR PBB SHADOW E&M-EST. PATIENT-LVL II: CPT | Mod: PBBFAC,,, | Performed by: ADVANCED PRACTICE MIDWIFE

## 2018-09-13 NOTE — PROGRESS NOTES
US today, girl! EIF noted, exp incidental finding, will do f/u at 28 wks. Round ligament pain discussed.   Coffective counseling sheet Learn Your Baby discussed with mother. Instructed regarding feeding cues and methods to calm baby. Encouraged mother to download Coffective mobile alley if she has not already done so.  Mother verbalized understanding.  al

## 2018-10-17 ENCOUNTER — ROUTINE PRENATAL (OUTPATIENT)
Dept: OBSTETRICS AND GYNECOLOGY | Facility: CLINIC | Age: 23
End: 2018-10-17
Payer: MEDICAID

## 2018-10-17 VITALS
BODY MASS INDEX: 36.71 KG/M2 | DIASTOLIC BLOOD PRESSURE: 64 MMHG | WEIGHT: 213.88 LBS | SYSTOLIC BLOOD PRESSURE: 118 MMHG

## 2018-10-17 DIAGNOSIS — O26.842 UTERINE SIZE-DATE DISCREPANCY IN SECOND TRIMESTER: ICD-10-CM

## 2018-10-17 DIAGNOSIS — Z34.81 SUPERVISION OF NORMAL INTRAUTERINE PREGNANCY IN MULTIGRAVIDA IN FIRST TRIMESTER: Primary | ICD-10-CM

## 2018-10-17 PROCEDURE — 99999 PR PBB SHADOW E&M-EST. PATIENT-LVL II: CPT | Mod: PBBFAC,,, | Performed by: ADVANCED PRACTICE MIDWIFE

## 2018-10-17 PROCEDURE — 99212 OFFICE O/P EST SF 10 MIN: CPT | Mod: PBBFAC,TH | Performed by: ADVANCED PRACTICE MIDWIFE

## 2018-10-17 PROCEDURE — 99213 OFFICE O/P EST LOW 20 MIN: CPT | Mod: TH,S$PBB,, | Performed by: ADVANCED PRACTICE MIDWIFE

## 2018-10-17 NOTE — PROGRESS NOTES
Doing well today. Thinks she may have the stomach bug. Diarrhea x 4 days, vomiting through the night. Able to keep some water/food down. Encouraged to rehydrate. Come to hospital if not able to keep fluids down or develop a temp.   Birth plan discussed.  Would like tub birth and little to no interventions.   Discussed labs for nv. Declines ob glucose. Discussed doing Hgb A1C and fasting glucose instead. Tdap handout given. Discussed s/s of pre-e    Coffective counseling sheet Nourish discussed with mother. Reinforced basic breastfeeding position and latch as well as proper hand expression technique. Encouraged mother to download Coffective mobile alley if she has not already done so.  Mother verbalizes understanding.

## 2018-11-08 ENCOUNTER — ROUTINE PRENATAL (OUTPATIENT)
Dept: OBSTETRICS AND GYNECOLOGY | Facility: CLINIC | Age: 23
End: 2018-11-08
Payer: MEDICAID

## 2018-11-08 ENCOUNTER — PROCEDURE VISIT (OUTPATIENT)
Dept: OBSTETRICS AND GYNECOLOGY | Facility: CLINIC | Age: 23
End: 2018-11-08
Payer: MEDICAID

## 2018-11-08 ENCOUNTER — LAB VISIT (OUTPATIENT)
Dept: LAB | Facility: HOSPITAL | Age: 23
End: 2018-11-08
Payer: MEDICAID

## 2018-11-08 VITALS
DIASTOLIC BLOOD PRESSURE: 76 MMHG | BODY MASS INDEX: 37.46 KG/M2 | WEIGHT: 218.25 LBS | SYSTOLIC BLOOD PRESSURE: 110 MMHG

## 2018-11-08 DIAGNOSIS — O26.842 UTERINE SIZE-DATE DISCREPANCY IN SECOND TRIMESTER: ICD-10-CM

## 2018-11-08 DIAGNOSIS — O26.843 UTERINE SIZE-DATE DISCREPANCY IN THIRD TRIMESTER: ICD-10-CM

## 2018-11-08 DIAGNOSIS — Z34.83 SUPERVISION OF NORMAL INTRAUTERINE PREGNANCY IN MULTIGRAVIDA IN THIRD TRIMESTER: Primary | ICD-10-CM

## 2018-11-08 DIAGNOSIS — Z34.81 SUPERVISION OF NORMAL INTRAUTERINE PREGNANCY IN MULTIGRAVIDA IN FIRST TRIMESTER: ICD-10-CM

## 2018-11-08 LAB
BASOPHILS # BLD AUTO: 0.04 K/UL
BASOPHILS NFR BLD: 0.3 %
DIFFERENTIAL METHOD: ABNORMAL
EOSINOPHIL # BLD AUTO: 0.6 K/UL
EOSINOPHIL NFR BLD: 5 %
ERYTHROCYTE [DISTWIDTH] IN BLOOD BY AUTOMATED COUNT: 13.7 %
ESTIMATED AVG GLUCOSE: 94 MG/DL
GLUCOSE SERPL-MCNC: 75 MG/DL
HBA1C MFR BLD HPLC: 4.9 %
HCT VFR BLD AUTO: 37 %
HGB BLD-MCNC: 11.5 G/DL
IMM GRANULOCYTES # BLD AUTO: 0.09 K/UL
IMM GRANULOCYTES NFR BLD AUTO: 0.8 %
LYMPHOCYTES # BLD AUTO: 2.3 K/UL
LYMPHOCYTES NFR BLD: 18.8 %
MCH RBC QN AUTO: 29.4 PG
MCHC RBC AUTO-ENTMCNC: 31.1 G/DL
MCV RBC AUTO: 95 FL
MONOCYTES # BLD AUTO: 0.9 K/UL
MONOCYTES NFR BLD: 7.2 %
NEUTROPHILS # BLD AUTO: 8.1 K/UL
NEUTROPHILS NFR BLD: 67.9 %
NRBC BLD-RTO: 0 /100 WBC
PLATELET # BLD AUTO: 344 K/UL
PMV BLD AUTO: 10.7 FL
RBC # BLD AUTO: 3.91 M/UL
WBC # BLD AUTO: 11.94 K/UL

## 2018-11-08 PROCEDURE — 36415 COLL VENOUS BLD VENIPUNCTURE: CPT

## 2018-11-08 PROCEDURE — 99212 OFFICE O/P EST SF 10 MIN: CPT | Mod: PBBFAC,TH,25 | Performed by: ADVANCED PRACTICE MIDWIFE

## 2018-11-08 PROCEDURE — 83036 HEMOGLOBIN GLYCOSYLATED A1C: CPT

## 2018-11-08 PROCEDURE — 76819 FETAL BIOPHYS PROFIL W/O NST: CPT | Mod: PBBFAC | Performed by: OBSTETRICS & GYNECOLOGY

## 2018-11-08 PROCEDURE — 76816 OB US FOLLOW-UP PER FETUS: CPT | Mod: 26,S$PBB,, | Performed by: OBSTETRICS & GYNECOLOGY

## 2018-11-08 PROCEDURE — 76819 FETAL BIOPHYS PROFIL W/O NST: CPT | Mod: 26,S$PBB,, | Performed by: OBSTETRICS & GYNECOLOGY

## 2018-11-08 PROCEDURE — 82947 ASSAY GLUCOSE BLOOD QUANT: CPT

## 2018-11-08 PROCEDURE — 86703 HIV-1/HIV-2 1 RESULT ANTBDY: CPT

## 2018-11-08 PROCEDURE — 76816 OB US FOLLOW-UP PER FETUS: CPT | Mod: PBBFAC | Performed by: OBSTETRICS & GYNECOLOGY

## 2018-11-08 PROCEDURE — 86592 SYPHILIS TEST NON-TREP QUAL: CPT

## 2018-11-08 PROCEDURE — 85025 COMPLETE CBC W/AUTO DIFF WBC: CPT

## 2018-11-08 PROCEDURE — 99999 PR PBB SHADOW E&M-EST. PATIENT-LVL II: CPT | Mod: PBBFAC,,, | Performed by: ADVANCED PRACTICE MIDWIFE

## 2018-11-08 PROCEDURE — 99213 OFFICE O/P EST LOW 20 MIN: CPT | Mod: TH,S$PBB,25, | Performed by: ADVANCED PRACTICE MIDWIFE

## 2018-11-08 RX ORDER — NAPROXEN SODIUM 220 MG/1
81 TABLET, FILM COATED ORAL DAILY
COMMUNITY
End: 2019-03-07

## 2018-11-08 NOTE — PROGRESS NOTES
Doing well today with no complaints.   Doing 3rd tri labs today.   Encouraged to increase hydration. Discussed FKC/ROM/PTL precautions.   US today shows no EIF seen, EFW 2#15oz (51%), 3VC, placenta posterior, normal HORTENCIA, and BPP 8/8. Discussed no other US unless there are problems.   Discussed Pre-E s/s and when to go to hospital.   Coffective counseling sheet Keep Baby Close discussed with mother. Reinforced rooming in practices, continued skin to skin, and quiet hours as requested by mother.  Encouraged mother to download Coffective mobile alley if she has not already done so. Mother verbalizes understanding.

## 2018-11-09 LAB
HIV 1+2 AB+HIV1 P24 AG SERPL QL IA: NEGATIVE
RPR SER QL: NORMAL

## 2018-11-20 ENCOUNTER — ROUTINE PRENATAL (OUTPATIENT)
Dept: OBSTETRICS AND GYNECOLOGY | Facility: CLINIC | Age: 23
End: 2018-11-20
Payer: MEDICAID

## 2018-11-20 VITALS
WEIGHT: 219.56 LBS | BODY MASS INDEX: 37.69 KG/M2 | DIASTOLIC BLOOD PRESSURE: 60 MMHG | SYSTOLIC BLOOD PRESSURE: 100 MMHG

## 2018-11-20 DIAGNOSIS — Z34.83 SUPERVISION OF NORMAL INTRAUTERINE PREGNANCY IN MULTIGRAVIDA IN THIRD TRIMESTER: Primary | ICD-10-CM

## 2018-11-20 DIAGNOSIS — Z87.59 HISTORY OF PRE-ECLAMPSIA: ICD-10-CM

## 2018-11-20 PROCEDURE — 99999 PR PBB SHADOW E&M-EST. PATIENT-LVL II: CPT | Mod: PBBFAC,,, | Performed by: ADVANCED PRACTICE MIDWIFE

## 2018-11-20 PROCEDURE — 99212 OFFICE O/P EST SF 10 MIN: CPT | Mod: PBBFAC,TH | Performed by: ADVANCED PRACTICE MIDWIFE

## 2018-11-20 PROCEDURE — 99213 OFFICE O/P EST LOW 20 MIN: CPT | Mod: TH,S$PBB,, | Performed by: ADVANCED PRACTICE MIDWIFE

## 2018-11-20 NOTE — PROGRESS NOTES
Doing well, no c/o. Getting excited yet anxious, holidays, 19 mos old at home,  taking MCAT 2 days before MIK. S/s of PTL discussed, good FM. Discussed tub births, contraindications. Declines tdap.al

## 2018-12-04 ENCOUNTER — ROUTINE PRENATAL (OUTPATIENT)
Dept: OBSTETRICS AND GYNECOLOGY | Facility: CLINIC | Age: 23
End: 2018-12-04
Payer: MEDICAID

## 2018-12-04 VITALS
BODY MASS INDEX: 38.26 KG/M2 | SYSTOLIC BLOOD PRESSURE: 108 MMHG | DIASTOLIC BLOOD PRESSURE: 68 MMHG | WEIGHT: 222.88 LBS

## 2018-12-04 DIAGNOSIS — R10.9 ACUTE RIGHT FLANK PAIN: ICD-10-CM

## 2018-12-04 DIAGNOSIS — Z34.83 SUPERVISION OF NORMAL INTRAUTERINE PREGNANCY IN MULTIGRAVIDA IN THIRD TRIMESTER: Primary | ICD-10-CM

## 2018-12-04 LAB
BACTERIA #/AREA URNS AUTO: ABNORMAL /HPF
BILIRUB UR QL STRIP: NEGATIVE
CLARITY UR REFRACT.AUTO: ABNORMAL
COLOR UR AUTO: YELLOW
GLUCOSE UR QL STRIP: NEGATIVE
HGB UR QL STRIP: NEGATIVE
KETONES UR QL STRIP: NEGATIVE
LEUKOCYTE ESTERASE UR QL STRIP: ABNORMAL
MICROSCOPIC COMMENT: ABNORMAL
NITRITE UR QL STRIP: NEGATIVE
PH UR STRIP: 7 [PH] (ref 5–8)
PROT UR QL STRIP: NEGATIVE
RBC #/AREA URNS AUTO: 0 /HPF (ref 0–4)
SP GR UR STRIP: 1.01 (ref 1–1.03)
SQUAMOUS #/AREA URNS AUTO: 13 /HPF
URN SPEC COLLECT METH UR: ABNORMAL
WBC #/AREA URNS AUTO: 11 /HPF (ref 0–5)

## 2018-12-04 PROCEDURE — 81001 URINALYSIS AUTO W/SCOPE: CPT

## 2018-12-04 PROCEDURE — 99999 PR PBB SHADOW E&M-EST. PATIENT-LVL II: CPT | Mod: PBBFAC,,, | Performed by: ADVANCED PRACTICE MIDWIFE

## 2018-12-04 PROCEDURE — 99212 OFFICE O/P EST SF 10 MIN: CPT | Mod: PBBFAC,TH | Performed by: ADVANCED PRACTICE MIDWIFE

## 2018-12-04 PROCEDURE — 99213 OFFICE O/P EST LOW 20 MIN: CPT | Mod: TH,S$PBB,, | Performed by: ADVANCED PRACTICE MIDWIFE

## 2018-12-04 NOTE — PROGRESS NOTES
"Good FM, no PTL s/s. Right side outer aspect around to flank region "hurts", had similar problem in previous pregnancy, is concerned possibly liver issue r/t pre eclampsia. Reassured overall pt looks great, bp normal, no headaches, no LE edema, can check CMP, denies urinary symptoms but will send UA to check for blood r/o stone. Exp prob MS pain, comfort measures discussed.   Pt and  know pt's desires as far as birth plans. Will prob not write formal plan. Had a specific one with prev preg for birth center, all went out the window with Pre E dx and IOL. Exp our midwifery standard of care. Family lives 4+ hours away.  supportive may help with delivery may not, will offer. Pt aware GBS after 35 wks. al  "

## 2018-12-07 ENCOUNTER — PATIENT MESSAGE (OUTPATIENT)
Dept: OBSTETRICS AND GYNECOLOGY | Facility: CLINIC | Age: 23
End: 2018-12-07

## 2018-12-07 DIAGNOSIS — O23.42 UTI (URINARY TRACT INFECTION) DURING PREGNANCY, SECOND TRIMESTER: ICD-10-CM

## 2018-12-07 RX ORDER — NITROFURANTOIN 25; 75 MG/1; MG/1
100 CAPSULE ORAL 2 TIMES DAILY
Qty: 14 CAPSULE | Refills: 0 | Status: SHIPPED | OUTPATIENT
Start: 2018-12-07 | End: 2018-12-14

## 2018-12-18 ENCOUNTER — ROUTINE PRENATAL (OUTPATIENT)
Dept: OBSTETRICS AND GYNECOLOGY | Facility: CLINIC | Age: 23
End: 2018-12-18
Payer: MEDICAID

## 2018-12-18 VITALS
WEIGHT: 223.56 LBS | BODY MASS INDEX: 38.37 KG/M2 | SYSTOLIC BLOOD PRESSURE: 116 MMHG | DIASTOLIC BLOOD PRESSURE: 70 MMHG

## 2018-12-18 DIAGNOSIS — Z34.83 SUPERVISION OF NORMAL INTRAUTERINE PREGNANCY IN MULTIGRAVIDA IN THIRD TRIMESTER: Primary | ICD-10-CM

## 2018-12-18 PROCEDURE — 99213 OFFICE O/P EST LOW 20 MIN: CPT | Mod: TH,S$PBB,, | Performed by: ADVANCED PRACTICE MIDWIFE

## 2018-12-18 PROCEDURE — 99999 PR PBB SHADOW E&M-EST. PATIENT-LVL II: CPT | Mod: PBBFAC,,, | Performed by: ADVANCED PRACTICE MIDWIFE

## 2018-12-18 PROCEDURE — 99212 OFFICE O/P EST SF 10 MIN: CPT | Mod: PBBFAC,TH | Performed by: ADVANCED PRACTICE MIDWIFE

## 2018-12-18 NOTE — PROGRESS NOTES
Good FM, some UC for the past 3 days off and on, no pattern, mild discomfort,  RUQ pain resolved. GBS next OV, handout provided. Discussed EPO starting at 37 wks. al

## 2018-12-29 ENCOUNTER — TELEPHONE (OUTPATIENT)
Dept: OBSTETRICS AND GYNECOLOGY | Facility: HOSPITAL | Age: 23
End: 2018-12-29

## 2018-12-29 ENCOUNTER — HOSPITAL ENCOUNTER (OUTPATIENT)
Facility: HOSPITAL | Age: 23
Discharge: HOME | End: 2018-12-29
Attending: OBSTETRICS & GYNECOLOGY | Admitting: OBSTETRICS & GYNECOLOGY
Payer: MEDICAID

## 2018-12-29 VITALS
TEMPERATURE: 99 F | SYSTOLIC BLOOD PRESSURE: 125 MMHG | DIASTOLIC BLOOD PRESSURE: 54 MMHG | BODY MASS INDEX: 38.24 KG/M2 | HEART RATE: 91 BPM | RESPIRATION RATE: 16 BRPM | HEIGHT: 64 IN | WEIGHT: 224 LBS

## 2018-12-29 DIAGNOSIS — O16.3 ELEVATED BLOOD PRESSURE AFFECTING PREGNANCY IN THIRD TRIMESTER, ANTEPARTUM: ICD-10-CM

## 2018-12-29 LAB
ALBUMIN SERPL BCP-MCNC: 2.8 G/DL
ALP SERPL-CCNC: 88 U/L
ALT SERPL W/O P-5'-P-CCNC: 6 U/L
ANION GAP SERPL CALC-SCNC: 11 MMOL/L
AST SERPL-CCNC: 14 U/L
BASOPHILS # BLD AUTO: 0.02 K/UL
BASOPHILS NFR BLD: 0.1 %
BILIRUB SERPL-MCNC: 0.3 MG/DL
BUN SERPL-MCNC: 6 MG/DL
CALCIUM SERPL-MCNC: 10.2 MG/DL
CHLORIDE SERPL-SCNC: 107 MMOL/L
CO2 SERPL-SCNC: 22 MMOL/L
CREAT SERPL-MCNC: 0.6 MG/DL
CREAT UR-MCNC: 59.6 MG/DL
DIFFERENTIAL METHOD: ABNORMAL
EOSINOPHIL # BLD AUTO: 0.3 K/UL
EOSINOPHIL NFR BLD: 1.8 %
ERYTHROCYTE [DISTWIDTH] IN BLOOD BY AUTOMATED COUNT: 13.8 %
EST. GFR  (AFRICAN AMERICAN): >60 ML/MIN/1.73 M^2
EST. GFR  (NON AFRICAN AMERICAN): >60 ML/MIN/1.73 M^2
GLUCOSE SERPL-MCNC: 77 MG/DL
HCT VFR BLD AUTO: 33.2 %
HGB BLD-MCNC: 11.1 G/DL
LYMPHOCYTES # BLD AUTO: 2.7 K/UL
LYMPHOCYTES NFR BLD: 19.6 %
MCH RBC QN AUTO: 29.6 PG
MCHC RBC AUTO-ENTMCNC: 33.4 G/DL
MCV RBC AUTO: 89 FL
MONOCYTES # BLD AUTO: 1.5 K/UL
MONOCYTES NFR BLD: 10.7 %
NEUTROPHILS # BLD AUTO: 9.3 K/UL
NEUTROPHILS NFR BLD: 68.2 %
PLATELET # BLD AUTO: 293 K/UL
PMV BLD AUTO: 10.2 FL
POTASSIUM SERPL-SCNC: 3.2 MMOL/L
PROT SERPL-MCNC: 6.1 G/DL
PROT UR-MCNC: <7 MG/DL
PROT/CREAT UR: NORMAL MG/G{CREAT}
RBC # BLD AUTO: 3.75 M/UL
SODIUM SERPL-SCNC: 140 MMOL/L
WBC # BLD AUTO: 13.71 K/UL

## 2018-12-29 PROCEDURE — 82570 ASSAY OF URINE CREATININE: CPT

## 2018-12-29 PROCEDURE — 99211 OFF/OP EST MAY X REQ PHY/QHP: CPT | Mod: 25,TH

## 2018-12-29 PROCEDURE — 99213 PR OFFICE/OUTPT VISIT, EST, LEVL III, 20-29 MIN: ICD-10-PCS | Mod: TH,25,S$PBB, | Performed by: ADVANCED PRACTICE MIDWIFE

## 2018-12-29 PROCEDURE — 59025 OBTAIN FETAL NONSTRESS TEST (NST): ICD-10-PCS | Mod: 26,S$PBB,, | Performed by: ADVANCED PRACTICE MIDWIFE

## 2018-12-29 PROCEDURE — 59025 FETAL NON-STRESS TEST: CPT | Mod: 26,S$PBB,, | Performed by: ADVANCED PRACTICE MIDWIFE

## 2018-12-29 PROCEDURE — 85025 COMPLETE CBC W/AUTO DIFF WBC: CPT

## 2018-12-29 PROCEDURE — 80053 COMPREHEN METABOLIC PANEL: CPT

## 2018-12-29 PROCEDURE — 59025 FETAL NON-STRESS TEST: CPT

## 2018-12-29 PROCEDURE — 99213 OFFICE O/P EST LOW 20 MIN: CPT | Mod: TH,25,S$PBB, | Performed by: ADVANCED PRACTICE MIDWIFE

## 2018-12-29 RX ORDER — ACETAMINOPHEN 500 MG
500 TABLET ORAL EVERY 6 HOURS PRN
Status: DISCONTINUED | OUTPATIENT
Start: 2018-12-29 | End: 2018-12-30 | Stop reason: HOSPADM

## 2018-12-29 RX ORDER — ONDANSETRON 8 MG/1
8 TABLET, ORALLY DISINTEGRATING ORAL EVERY 8 HOURS PRN
Status: DISCONTINUED | OUTPATIENT
Start: 2018-12-29 | End: 2018-12-30 | Stop reason: HOSPADM

## 2018-12-30 NOTE — DISCHARGE INSTRUCTIONS

## 2018-12-30 NOTE — HPI
C/o elevated blood pressure this morning and afternoon, history of PIH, denies HA, blurry vision, or epigastric pain

## 2018-12-30 NOTE — DISCHARGE SUMMARY
[facility]  Obstetrics  Discharge Summary      Patient Name: Beth Jo  MRN: [id number]  Admission Date: 12/29/2018  Hospital Length of Stay: 0 days  Discharge Date and Time:  12/29/2018 9:44 PM  Attending Physician: Marysol Kelley MD   Discharging Provider: Santiago Klein CNM  Primary Care Provider: Primary Doctor No    HPI: C/o elevated blood pressure this morning and afternoon, history of PIH, denies HA, blurry vision, or epigastric pain    * No surgery found *     Hospital Course:   PIH Workup negative  Serial Blood Pressures WNL        Final Active Diagnoses:    Diagnosis Date Noted POA    PRINCIPAL PROBLEM:  Elevated blood pressure affecting pregnancy in third trimester, antepartum [O16.3] 12/29/2018 Yes    History of pre-eclampsia [Z87.59] 05/29/2018 Not Applicable      Problems Resolved During this Admission:        Labs: All labs within the past 24 hours have been reviewed    Immunizations     None          This patient has no babies on file.  Pending Diagnostic Studies:     None          Discharged Condition: good    Disposition: Home or Self Care    Follow Up:    Patient Instructions:   No discharge procedures on file.  Medications:  Current Discharge Medication List      CONTINUE these medications which have NOT CHANGED    Details   aspirin 81 MG Chew Take 81 mg by mouth once daily.      multivitamin capsule Take 1 capsule by mouth once daily.             Santiago Klein CNM  Obstetrics  [facility]

## 2018-12-30 NOTE — ASSESSMENT & PLAN NOTE
PIH workup negative  Serial blood pressures WNL  RNST  Discharge instructions reviewed including PIH S&S, PTL precautions, bleeding precautions and fetal movements, stressed hydration

## 2018-12-30 NOTE — PROCEDURES
Beth Jo is a 23 y.o. female patient.    Pulse: 91 (12/29/18 2117)  BP: (!) 125/54 (12/29/18 2117)       Obtain Fetal nonstress test (NST)  Date/Time: 12/29/2018 9:41 PM  Performed by: Santiago Klein CNM  Authorized by: Santiago Klein CNM     Nonstress Test:     Variability:  6-25 BPM    Decelerations:  None    Accelerations:  15 bpm    Acoustic Stimulator: No      Baseline:  135    Uterine Irritability: No      Contractions:  Not present  Biophysical Profile:     Nonstress Test Interpretation: reactive      Overall Impression:  Reassuring  Post-procedure:     Patient tolerance:  Patient tolerated the procedure well with no immediate complications        Santiago Klein  12/29/2018

## 2018-12-30 NOTE — H&P
[facility]   Obstetrics  History & Physical    Patient Name: Beth Jo  MRN: [id number]  Admission Date: 2018  Primary Care Provider: Primary Doctor No    Subjective:     Principal Problem:Elevated blood pressure affecting pregnancy in third trimester, antepartum    History of Present Illness:  C/o elevated blood pressure this morning and afternoon, history of PIH, denies HA, blurry vision, or epigastric pain    Obstetric HPI:  Patient reports None contractions, active fetal movement, No vaginal bleeding , No loss of fluid     This pregnancy has been complicated by   History of Pre-eclampsia with last pregnancy   UTI, T2, treated      Obstetric History       T1      L1     SAB0   TAB0   Ectopic0   Multiple0   Live Births1       # Outcome Date GA Lbr Maik/2nd Weight Sex Delivery Anes PTL Lv   2 Current            1 Term 17 38w1d / 01:48 3.232 kg (7 lb 2 oz) M Vag-Spont EPI N NARINDER      Name: BRYAN, TOM LUX      Apgar1:  8                Apgar5: 9        Past Medical History:   Diagnosis Date    Pre-eclampsia      No past surgical history on file.    PTA Medications   Medication Sig    aspirin 81 MG Chew Take 81 mg by mouth once daily.    multivitamin capsule Take 1 capsule by mouth once daily.       Review of patient's allergies indicates:   Allergen Reactions    Amoxil [amoxicillin]         Family History     Problem Relation (Age of Onset)    Hypertension Mother        Tobacco Use    Smoking status: Never Smoker   Substance and Sexual Activity    Alcohol use: No    Drug use: No    Sexual activity: Yes     Partners: Male     Birth control/protection: None     Review of Systems   Eyes: Negative for visual disturbance.   Gastrointestinal: Negative for abdominal pain.   Neurological: Negative for headaches.   All other systems reviewed and are negative.     Objective:     Vital Signs (Most Recent):    Vital Signs (24h Range):           There is no height or weight on  file to calculate BMI.    FHT: 140 Cat 1 (reassuring)  TOCO:  None    Physical Exam:   Constitutional: She is oriented to person, place, and time. She appears well-developed and well-nourished.    HENT:   Head: Normocephalic.     Neck: Normal range of motion.    Cardiovascular: Normal rate.     Pulmonary/Chest: Effort normal.        Abdominal: Soft.   Non-tender             Musculoskeletal: Normal range of motion.       Neurological: She is alert and oriented to person, place, and time. She has normal reflexes.    Skin: Skin is warm and dry.    Psychiatric: She has a normal mood and affect. Her behavior is normal. Judgment and thought content normal.       Cervix:  Deferred     Significant Labs:  Lab Results   Component Value Date    GROUPTRH A POS 2018    HEPBSAG Negative 2018    STREPBCULT negative 2017       I have personallly reviewed all pertinent lab results from the last 24 hours.    Assessment/Plan:     23 y.o. female  at 35w6d for:    * Elevated blood pressure affecting pregnancy in third trimester, antepartum    PIH workup   Serial blood pressures  NST         Santiago Klein CNM  Obstetrics  [facility]

## 2018-12-30 NOTE — SUBJECTIVE & OBJECTIVE
Obstetric HPI:  Patient reports None contractions, active fetal movement, No vaginal bleeding , No loss of fluid     This pregnancy has been complicated by   History of Pre-eclampsia with last pregnancy   UTI, T2, treated      Obstetric History       T1      L1     SAB0   TAB0   Ectopic0   Multiple0   Live Births1       # Outcome Date GA Lbr Maik/2nd Weight Sex Delivery Anes PTL Lv   2 Current            1 Term 17 38w1d / 01:48 3.232 kg (7 lb 2 oz) M Vag-Spont EPI N NARINDER      Name: TOM ADAMS      Apgar1:  8                Apgar5: 9        Past Medical History:   Diagnosis Date    Pre-eclampsia      No past surgical history on file.    PTA Medications   Medication Sig    aspirin 81 MG Chew Take 81 mg by mouth once daily.    multivitamin capsule Take 1 capsule by mouth once daily.       Review of patient's allergies indicates:   Allergen Reactions    Amoxil [amoxicillin]         Family History     Problem Relation (Age of Onset)    Hypertension Mother        Tobacco Use    Smoking status: Never Smoker   Substance and Sexual Activity    Alcohol use: No    Drug use: No    Sexual activity: Yes     Partners: Male     Birth control/protection: None     Review of Systems   Eyes: Negative for visual disturbance.   Gastrointestinal: Negative for abdominal pain.   Neurological: Negative for headaches.   All other systems reviewed and are negative.     Objective:     Vital Signs (Most Recent):    Vital Signs (24h Range):           There is no height or weight on file to calculate BMI.    FHT: 140 Cat 1 (reassuring)  TOCO:  None    Physical Exam:   Constitutional: She is oriented to person, place, and time. She appears well-developed and well-nourished.    HENT:   Head: Normocephalic.     Neck: Normal range of motion.    Cardiovascular: Normal rate.     Pulmonary/Chest: Effort normal.        Abdominal: Soft.   Non-tender             Musculoskeletal: Normal range of motion.       Neurological:  She is alert and oriented to person, place, and time. She has normal reflexes.    Skin: Skin is warm and dry.    Psychiatric: She has a normal mood and affect. Her behavior is normal. Judgment and thought content normal.       Cervix:  Deferred     Significant Labs:  Lab Results   Component Value Date    GROUPTRH A POS 05/29/2018    HEPBSAG Negative 05/29/2018    STREPBCULT negative 03/23/2017       I have personallly reviewed all pertinent lab results from the last 24 hours.

## 2019-01-02 ENCOUNTER — ROUTINE PRENATAL (OUTPATIENT)
Dept: OBSTETRICS AND GYNECOLOGY | Facility: CLINIC | Age: 24
End: 2019-01-02
Payer: MEDICAID

## 2019-01-02 VITALS
SYSTOLIC BLOOD PRESSURE: 124 MMHG | BODY MASS INDEX: 39.54 KG/M2 | WEIGHT: 230.38 LBS | DIASTOLIC BLOOD PRESSURE: 78 MMHG

## 2019-01-02 DIAGNOSIS — Z34.83 SUPERVISION OF NORMAL INTRAUTERINE PREGNANCY IN MULTIGRAVIDA IN THIRD TRIMESTER: Primary | ICD-10-CM

## 2019-01-02 PROCEDURE — 99213 OFFICE O/P EST LOW 20 MIN: CPT | Mod: TH,S$PBB,, | Performed by: ADVANCED PRACTICE MIDWIFE

## 2019-01-02 PROCEDURE — 99213 PR OFFICE/OUTPT VISIT, EST, LEVL III, 20-29 MIN: ICD-10-PCS | Mod: TH,S$PBB,, | Performed by: ADVANCED PRACTICE MIDWIFE

## 2019-01-02 PROCEDURE — 87081 CULTURE SCREEN ONLY: CPT

## 2019-01-02 PROCEDURE — 99999 PR PBB SHADOW E&M-EST. PATIENT-LVL II: ICD-10-PCS | Mod: PBBFAC,,, | Performed by: ADVANCED PRACTICE MIDWIFE

## 2019-01-02 PROCEDURE — 99212 OFFICE O/P EST SF 10 MIN: CPT | Mod: PBBFAC,TH | Performed by: ADVANCED PRACTICE MIDWIFE

## 2019-01-02 PROCEDURE — 99999 PR PBB SHADOW E&M-EST. PATIENT-LVL II: CPT | Mod: PBBFAC,,, | Performed by: ADVANCED PRACTICE MIDWIFE

## 2019-01-02 NOTE — PROGRESS NOTES
Doing well today with no complaints. Having some ctx but nothing consistent. Denies LOF or VB.   GBS collected. Declines VE.  Patient is worried due to 6 pound weight gain. Denies HA/CNS symptoms. Reflexes brisk to bilateral patellar. Urine dip negative. Discussed the importancy of going to hospital if pre-e s/s start.   Reviewed FKC/ROM/Bleeding precautions.   Stop ASA nv.

## 2019-01-03 ENCOUNTER — PATIENT MESSAGE (OUTPATIENT)
Dept: OBSTETRICS AND GYNECOLOGY | Facility: CLINIC | Age: 24
End: 2019-01-03

## 2019-01-04 LAB — BACTERIA SPEC AEROBE CULT: NORMAL

## 2019-01-09 ENCOUNTER — PROCEDURE VISIT (OUTPATIENT)
Dept: OBSTETRICS AND GYNECOLOGY | Facility: CLINIC | Age: 24
End: 2019-01-09
Payer: MEDICAID

## 2019-01-09 ENCOUNTER — ROUTINE PRENATAL (OUTPATIENT)
Dept: OBSTETRICS AND GYNECOLOGY | Facility: CLINIC | Age: 24
End: 2019-01-09
Payer: MEDICAID

## 2019-01-09 VITALS
BODY MASS INDEX: 39.43 KG/M2 | SYSTOLIC BLOOD PRESSURE: 116 MMHG | WEIGHT: 229.75 LBS | DIASTOLIC BLOOD PRESSURE: 60 MMHG

## 2019-01-09 DIAGNOSIS — O36.8191 DECREASED FETAL MOVEMENT DURING PREGNANCY, ANTEPARTUM, FETUS 1 OF MULTIPLE GESTATION: ICD-10-CM

## 2019-01-09 DIAGNOSIS — O36.8191 DECREASED FETAL MOVEMENT DURING PREGNANCY, ANTEPARTUM, FETUS 1 OF MULTIPLE GESTATION: Primary | ICD-10-CM

## 2019-01-09 DIAGNOSIS — Z34.83 SUPERVISION OF NORMAL INTRAUTERINE PREGNANCY IN MULTIGRAVIDA IN THIRD TRIMESTER: ICD-10-CM

## 2019-01-09 PROCEDURE — 76816 OB US FOLLOW-UP PER FETUS: CPT | Mod: 26,59,S$PBB, | Performed by: OBSTETRICS & GYNECOLOGY

## 2019-01-09 PROCEDURE — 76819 PR US, OB, FETAL BIOPHYSICAL, W/O NST: ICD-10-PCS | Mod: 26,59,S$PBB, | Performed by: OBSTETRICS & GYNECOLOGY

## 2019-01-09 PROCEDURE — 99212 OFFICE O/P EST SF 10 MIN: CPT | Mod: PBBFAC,TH,25 | Performed by: ADVANCED PRACTICE MIDWIFE

## 2019-01-09 PROCEDURE — 99213 PR OFFICE/OUTPT VISIT, EST, LEVL III, 20-29 MIN: ICD-10-PCS | Mod: 25,TH,S$PBB, | Performed by: ADVANCED PRACTICE MIDWIFE

## 2019-01-09 PROCEDURE — 76819 FETAL BIOPHYS PROFIL W/O NST: CPT | Mod: 26,59,S$PBB, | Performed by: OBSTETRICS & GYNECOLOGY

## 2019-01-09 PROCEDURE — 76816 OB US FOLLOW-UP PER FETUS: CPT | Mod: 59,PBBFAC | Performed by: OBSTETRICS & GYNECOLOGY

## 2019-01-09 PROCEDURE — 99999 PR PBB SHADOW E&M-EST. PATIENT-LVL II: ICD-10-PCS | Mod: PBBFAC,,, | Performed by: ADVANCED PRACTICE MIDWIFE

## 2019-01-09 PROCEDURE — 76816 PR  US,PREGNANT UTERUS,F/U,TRANSABD APP: ICD-10-PCS | Mod: 26,59,S$PBB, | Performed by: OBSTETRICS & GYNECOLOGY

## 2019-01-09 PROCEDURE — 99999 PR PBB SHADOW E&M-EST. PATIENT-LVL II: CPT | Mod: PBBFAC,,, | Performed by: ADVANCED PRACTICE MIDWIFE

## 2019-01-09 PROCEDURE — 99213 OFFICE O/P EST LOW 20 MIN: CPT | Mod: 25,TH,S$PBB, | Performed by: ADVANCED PRACTICE MIDWIFE

## 2019-01-09 PROCEDURE — 76819 FETAL BIOPHYS PROFIL W/O NST: CPT | Mod: 59,PBBFAC | Performed by: OBSTETRICS & GYNECOLOGY

## 2019-01-16 ENCOUNTER — ROUTINE PRENATAL (OUTPATIENT)
Dept: OBSTETRICS AND GYNECOLOGY | Facility: CLINIC | Age: 24
End: 2019-01-16
Payer: MEDICAID

## 2019-01-16 VITALS — SYSTOLIC BLOOD PRESSURE: 142 MMHG | WEIGHT: 231.25 LBS | DIASTOLIC BLOOD PRESSURE: 82 MMHG | BODY MASS INDEX: 39.7 KG/M2

## 2019-01-16 DIAGNOSIS — O16.3 ELEVATED BLOOD PRESSURE AFFECTING PREGNANCY IN THIRD TRIMESTER, ANTEPARTUM: Primary | ICD-10-CM

## 2019-01-16 PROCEDURE — 99999 PR PBB SHADOW E&M-EST. PATIENT-LVL II: CPT | Mod: PBBFAC,,, | Performed by: ADVANCED PRACTICE MIDWIFE

## 2019-01-16 PROCEDURE — 99212 OFFICE O/P EST SF 10 MIN: CPT | Mod: PBBFAC,TH | Performed by: ADVANCED PRACTICE MIDWIFE

## 2019-01-16 PROCEDURE — 99213 OFFICE O/P EST LOW 20 MIN: CPT | Mod: TH,S$PBB,, | Performed by: ADVANCED PRACTICE MIDWIFE

## 2019-01-16 PROCEDURE — 99999 PR PBB SHADOW E&M-EST. PATIENT-LVL II: ICD-10-PCS | Mod: PBBFAC,,, | Performed by: ADVANCED PRACTICE MIDWIFE

## 2019-01-16 PROCEDURE — 99213 PR OFFICE/OUTPT VISIT, EST, LEVL III, 20-29 MIN: ICD-10-PCS | Mod: TH,S$PBB,, | Performed by: ADVANCED PRACTICE MIDWIFE

## 2019-01-16 NOTE — PROGRESS NOTES
Reports good FM.  BP re-check 122/88, urine dip trace protein.  Denies any CNS S/S.  PIH warning signs to report to LND for reviewed (HA, visual changes, swelling, URQ pain).  FKC's, and S/S labor reviewed.  Patient will RTC Friday for BP check and urine dip, then RTC 1 week with BPP .  Declines SVE

## 2019-01-18 ENCOUNTER — CLINICAL SUPPORT (OUTPATIENT)
Dept: OBSTETRICS AND GYNECOLOGY | Facility: CLINIC | Age: 24
End: 2019-01-18
Payer: MEDICAID

## 2019-01-18 PROCEDURE — 99211 OFF/OP EST MAY X REQ PHY/QHP: CPT | Mod: PBBFAC

## 2019-01-18 PROCEDURE — 99999 PR PBB SHADOW E&M-EST. PATIENT-LVL I: ICD-10-PCS | Mod: PBBFAC,,,

## 2019-01-18 PROCEDURE — 99999 PR PBB SHADOW E&M-EST. PATIENT-LVL I: CPT | Mod: PBBFAC,,,

## 2019-01-18 NOTE — PROGRESS NOTES
Identified by 2 pt identifiers. Allergies and medications reviewed. BP sitting in right arm 130/86. Urine negative for protein. Advised pt to follow up next week for appointment per MARICRUZ Murillo. Pt verbalized understanding.

## 2019-01-23 ENCOUNTER — ROUTINE PRENATAL (OUTPATIENT)
Dept: OBSTETRICS AND GYNECOLOGY | Facility: CLINIC | Age: 24
End: 2019-01-23
Payer: MEDICAID

## 2019-01-23 VITALS — BODY MASS INDEX: 39.73 KG/M2 | SYSTOLIC BLOOD PRESSURE: 122 MMHG | WEIGHT: 231.5 LBS | DIASTOLIC BLOOD PRESSURE: 78 MMHG

## 2019-01-23 DIAGNOSIS — Z34.83 ENCOUNTER FOR SUPERVISION OF OTHER NORMAL PREGNANCY IN THIRD TRIMESTER: Primary | ICD-10-CM

## 2019-01-23 PROCEDURE — 99213 PR OFFICE/OUTPT VISIT, EST, LEVL III, 20-29 MIN: ICD-10-PCS | Mod: TH,S$PBB,, | Performed by: ADVANCED PRACTICE MIDWIFE

## 2019-01-23 PROCEDURE — 99999 PR PBB SHADOW E&M-EST. PATIENT-LVL II: CPT | Mod: PBBFAC,,, | Performed by: ADVANCED PRACTICE MIDWIFE

## 2019-01-23 PROCEDURE — 99212 OFFICE O/P EST SF 10 MIN: CPT | Mod: PBBFAC,TH | Performed by: ADVANCED PRACTICE MIDWIFE

## 2019-01-23 PROCEDURE — 99213 OFFICE O/P EST LOW 20 MIN: CPT | Mod: TH,S$PBB,, | Performed by: ADVANCED PRACTICE MIDWIFE

## 2019-01-23 PROCEDURE — 99999 PR PBB SHADOW E&M-EST. PATIENT-LVL II: ICD-10-PCS | Mod: PBBFAC,,, | Performed by: ADVANCED PRACTICE MIDWIFE

## 2019-01-30 ENCOUNTER — ROUTINE PRENATAL (OUTPATIENT)
Dept: OBSTETRICS AND GYNECOLOGY | Facility: CLINIC | Age: 24
End: 2019-01-30
Payer: MEDICAID

## 2019-01-30 VITALS
DIASTOLIC BLOOD PRESSURE: 82 MMHG | BODY MASS INDEX: 40.38 KG/M2 | SYSTOLIC BLOOD PRESSURE: 144 MMHG | WEIGHT: 235.25 LBS

## 2019-01-30 DIAGNOSIS — Z34.83 SUPERVISION OF NORMAL INTRAUTERINE PREGNANCY IN MULTIGRAVIDA IN THIRD TRIMESTER: Primary | ICD-10-CM

## 2019-01-30 PROCEDURE — 99213 OFFICE O/P EST LOW 20 MIN: CPT | Mod: TH,S$PBB,, | Performed by: ADVANCED PRACTICE MIDWIFE

## 2019-01-30 PROCEDURE — 99999 PR PBB SHADOW E&M-EST. PATIENT-LVL II: ICD-10-PCS | Mod: PBBFAC,,, | Performed by: ADVANCED PRACTICE MIDWIFE

## 2019-01-30 PROCEDURE — 99999 PR PBB SHADOW E&M-EST. PATIENT-LVL II: CPT | Mod: PBBFAC,,, | Performed by: ADVANCED PRACTICE MIDWIFE

## 2019-01-30 PROCEDURE — 99213 PR OFFICE/OUTPT VISIT, EST, LEVL III, 20-29 MIN: ICD-10-PCS | Mod: TH,S$PBB,, | Performed by: ADVANCED PRACTICE MIDWIFE

## 2019-01-30 PROCEDURE — 99212 OFFICE O/P EST SF 10 MIN: CPT | Mod: PBBFAC,TH | Performed by: ADVANCED PRACTICE MIDWIFE

## 2019-01-30 NOTE — PROGRESS NOTES
Doing ok, ready for baby   VE per pt request, declines membrane sweeping today   Labor precautions and fetal movement reviewed, when to go to hospital   Stressed hydration   IOL offered for postdates, pt declines at this time, reviewed recommendations for post date monitoring, scheduled for BPP Friday and Monday

## 2019-02-01 ENCOUNTER — PROCEDURE VISIT (OUTPATIENT)
Dept: OBSTETRICS AND GYNECOLOGY | Facility: CLINIC | Age: 24
End: 2019-02-01
Payer: MEDICAID

## 2019-02-01 ENCOUNTER — ROUTINE PRENATAL (OUTPATIENT)
Dept: OBSTETRICS AND GYNECOLOGY | Facility: CLINIC | Age: 24
End: 2019-02-01
Payer: MEDICAID

## 2019-02-01 VITALS
BODY MASS INDEX: 39.85 KG/M2 | SYSTOLIC BLOOD PRESSURE: 138 MMHG | DIASTOLIC BLOOD PRESSURE: 88 MMHG | WEIGHT: 232.13 LBS

## 2019-02-01 DIAGNOSIS — O16.3 ELEVATED BLOOD PRESSURE AFFECTING PREGNANCY IN THIRD TRIMESTER, ANTEPARTUM: ICD-10-CM

## 2019-02-01 DIAGNOSIS — O48.0 POST-TERM PREGNANCY, 40-42 WEEKS OF GESTATION: ICD-10-CM

## 2019-02-01 PROCEDURE — 76816 OB US FOLLOW-UP PER FETUS: CPT | Mod: PBBFAC | Performed by: OBSTETRICS & GYNECOLOGY

## 2019-02-01 PROCEDURE — 76819 FETAL BIOPHYS PROFIL W/O NST: CPT | Mod: PBBFAC | Performed by: OBSTETRICS & GYNECOLOGY

## 2019-02-01 PROCEDURE — 99212 OFFICE O/P EST SF 10 MIN: CPT | Mod: PBBFAC,TH | Performed by: ADVANCED PRACTICE MIDWIFE

## 2019-02-01 PROCEDURE — 99213 OFFICE O/P EST LOW 20 MIN: CPT | Mod: TH,S$PBB,, | Performed by: ADVANCED PRACTICE MIDWIFE

## 2019-02-01 PROCEDURE — 99213 PR OFFICE/OUTPT VISIT, EST, LEVL III, 20-29 MIN: ICD-10-PCS | Mod: TH,S$PBB,, | Performed by: ADVANCED PRACTICE MIDWIFE

## 2019-02-01 PROCEDURE — 99999 PR PBB SHADOW E&M-EST. PATIENT-LVL II: CPT | Mod: PBBFAC,,, | Performed by: ADVANCED PRACTICE MIDWIFE

## 2019-02-01 PROCEDURE — 99999 PR PBB SHADOW E&M-EST. PATIENT-LVL II: ICD-10-PCS | Mod: PBBFAC,,, | Performed by: ADVANCED PRACTICE MIDWIFE

## 2019-02-01 PROCEDURE — 76816 PR  US,PREGNANT UTERUS,F/U,TRANSABD APP: ICD-10-PCS | Mod: 26,S$PBB,, | Performed by: OBSTETRICS & GYNECOLOGY

## 2019-02-01 PROCEDURE — 76816 OB US FOLLOW-UP PER FETUS: CPT | Mod: 26,S$PBB,, | Performed by: OBSTETRICS & GYNECOLOGY

## 2019-02-01 PROCEDURE — 76819 FETAL BIOPHYS PROFIL W/O NST: CPT | Mod: 26,S$PBB,, | Performed by: OBSTETRICS & GYNECOLOGY

## 2019-02-01 PROCEDURE — 76819 PR US, OB, FETAL BIOPHYSICAL, W/O NST: ICD-10-PCS | Mod: 26,S$PBB,, | Performed by: OBSTETRICS & GYNECOLOGY

## 2019-02-01 NOTE — PROGRESS NOTES
Reports good FM.  US reviewed, VTX, EFW 68%, MVP 7.5, BPP 8 of 8   S/S labor, FKC's,  and when to report to hospital reviewed.  Has RTC with BPP scheduled for Monday if undelivered.  Membrane sweep preformed.

## 2019-02-04 ENCOUNTER — PROCEDURE VISIT (OUTPATIENT)
Dept: OBSTETRICS AND GYNECOLOGY | Facility: CLINIC | Age: 24
End: 2019-02-04
Payer: MEDICAID

## 2019-02-04 ENCOUNTER — HOSPITAL ENCOUNTER (INPATIENT)
Facility: HOSPITAL | Age: 24
LOS: 2 days | Discharge: HOME | End: 2019-02-06
Attending: OBSTETRICS & GYNECOLOGY | Admitting: OBSTETRICS & GYNECOLOGY
Payer: MEDICAID

## 2019-02-04 ENCOUNTER — ROUTINE PRENATAL (OUTPATIENT)
Dept: OBSTETRICS AND GYNECOLOGY | Facility: CLINIC | Age: 24
End: 2019-02-04
Payer: MEDICAID

## 2019-02-04 VITALS
SYSTOLIC BLOOD PRESSURE: 150 MMHG | DIASTOLIC BLOOD PRESSURE: 90 MMHG | WEIGHT: 233.69 LBS | BODY MASS INDEX: 40.11 KG/M2

## 2019-02-04 DIAGNOSIS — O48.0 POST-TERM PREGNANCY, 40-42 WEEKS OF GESTATION: ICD-10-CM

## 2019-02-04 DIAGNOSIS — O48.0 POST-DATES PREGNANCY: Primary | ICD-10-CM

## 2019-02-04 DIAGNOSIS — Z87.59 HISTORY OF PRE-ECLAMPSIA: ICD-10-CM

## 2019-02-04 DIAGNOSIS — Z37.9 NORMAL LABOR: ICD-10-CM

## 2019-02-04 DIAGNOSIS — O16.3 ELEVATED BLOOD PRESSURE AFFECTING PREGNANCY IN THIRD TRIMESTER, ANTEPARTUM: ICD-10-CM

## 2019-02-04 DIAGNOSIS — O48.0 POST-TERM PREGNANCY, 40-42 WEEKS OF GESTATION: Primary | ICD-10-CM

## 2019-02-04 PROBLEM — Z34.83 SUPERVISION OF NORMAL INTRAUTERINE PREGNANCY IN MULTIGRAVIDA IN THIRD TRIMESTER: Status: RESOLVED | Noted: 2018-06-15 | Resolved: 2019-02-04

## 2019-02-04 PROBLEM — O23.42 UTI (URINARY TRACT INFECTION) DURING PREGNANCY, SECOND TRIMESTER: Status: RESOLVED | Noted: 2018-12-07 | Resolved: 2019-02-04

## 2019-02-04 LAB
ABO + RH BLD: NORMAL
BASOPHILS # BLD AUTO: 0.01 K/UL
BASOPHILS NFR BLD: 0.1 %
BLD GP AB SCN CELLS X3 SERPL QL: NORMAL
DIFFERENTIAL METHOD: ABNORMAL
EOSINOPHIL # BLD AUTO: 0.3 K/UL
EOSINOPHIL NFR BLD: 2.3 %
ERYTHROCYTE [DISTWIDTH] IN BLOOD BY AUTOMATED COUNT: 14.6 %
HCT VFR BLD AUTO: 36.9 %
HGB BLD-MCNC: 12.1 G/DL
LYMPHOCYTES # BLD AUTO: 2.3 K/UL
LYMPHOCYTES NFR BLD: 16.3 %
MCH RBC QN AUTO: 29.1 PG
MCHC RBC AUTO-ENTMCNC: 32.8 G/DL
MCV RBC AUTO: 89 FL
MONOCYTES # BLD AUTO: 1.2 K/UL
MONOCYTES NFR BLD: 8.5 %
NEUTROPHILS # BLD AUTO: 10.2 K/UL
NEUTROPHILS NFR BLD: 73.4 %
PLATELET # BLD AUTO: 285 K/UL
PMV BLD AUTO: 10.8 FL
RBC # BLD AUTO: 4.16 M/UL
WBC # BLD AUTO: 14.06 K/UL

## 2019-02-04 PROCEDURE — 99213 OFFICE O/P EST LOW 20 MIN: CPT | Mod: TH,S$PBB,, | Performed by: ADVANCED PRACTICE MIDWIFE

## 2019-02-04 PROCEDURE — 63600175 PHARM REV CODE 636 W HCPCS: Performed by: ADVANCED PRACTICE MIDWIFE

## 2019-02-04 PROCEDURE — 99999 PR PBB SHADOW E&M-EST. PATIENT-LVL II: ICD-10-PCS | Mod: PBBFAC,,, | Performed by: ADVANCED PRACTICE MIDWIFE

## 2019-02-04 PROCEDURE — 72200004 HC VAGINAL DELIVERY LEVEL I

## 2019-02-04 PROCEDURE — 59409 OBSTETRICAL CARE: CPT | Mod: GB,,, | Performed by: ADVANCED PRACTICE MIDWIFE

## 2019-02-04 PROCEDURE — 59409 PR OBSTETRICAL CARE,VAG DELIV ONLY: ICD-10-PCS | Mod: GB,,, | Performed by: ADVANCED PRACTICE MIDWIFE

## 2019-02-04 PROCEDURE — 99999 PR PBB SHADOW E&M-EST. PATIENT-LVL II: CPT | Mod: PBBFAC,,, | Performed by: ADVANCED PRACTICE MIDWIFE

## 2019-02-04 PROCEDURE — 11000001 HC ACUTE MED/SURG PRIVATE ROOM

## 2019-02-04 PROCEDURE — 86850 RBC ANTIBODY SCREEN: CPT

## 2019-02-04 PROCEDURE — 99212 OFFICE O/P EST SF 10 MIN: CPT | Mod: PBBFAC,TH,25 | Performed by: ADVANCED PRACTICE MIDWIFE

## 2019-02-04 PROCEDURE — 76819 FETAL BIOPHYS PROFIL W/O NST: CPT | Mod: 26,S$PBB,, | Performed by: OBSTETRICS & GYNECOLOGY

## 2019-02-04 PROCEDURE — 36415 COLL VENOUS BLD VENIPUNCTURE: CPT

## 2019-02-04 PROCEDURE — 99213 PR OFFICE/OUTPT VISIT, EST, LEVL III, 20-29 MIN: ICD-10-PCS | Mod: TH,S$PBB,, | Performed by: ADVANCED PRACTICE MIDWIFE

## 2019-02-04 PROCEDURE — 85025 COMPLETE CBC W/AUTO DIFF WBC: CPT

## 2019-02-04 PROCEDURE — 76819 FETAL BIOPHYS PROFIL W/O NST: CPT | Mod: PBBFAC | Performed by: OBSTETRICS & GYNECOLOGY

## 2019-02-04 PROCEDURE — 25000003 PHARM REV CODE 250: Performed by: ADVANCED PRACTICE MIDWIFE

## 2019-02-04 PROCEDURE — 72100002 HC LABOR CARE, 1ST 8 HOURS

## 2019-02-04 PROCEDURE — 76819 PR US, OB, FETAL BIOPHYSICAL, W/O NST: ICD-10-PCS | Mod: 26,S$PBB,, | Performed by: OBSTETRICS & GYNECOLOGY

## 2019-02-04 RX ORDER — HYDROCORTISONE 25 MG/G
CREAM TOPICAL 3 TIMES DAILY PRN
Status: DISCONTINUED | OUTPATIENT
Start: 2019-02-04 | End: 2019-02-06 | Stop reason: HOSPADM

## 2019-02-04 RX ORDER — OXYTOCIN/RINGER'S LACTATE 20/1000 ML
333 PLASTIC BAG, INJECTION (ML) INTRAVENOUS CONTINUOUS
Status: DISCONTINUED | OUTPATIENT
Start: 2019-02-04 | End: 2019-02-04

## 2019-02-04 RX ORDER — OXYTOCIN/RINGER'S LACTATE 20/1000 ML
41.65 PLASTIC BAG, INJECTION (ML) INTRAVENOUS CONTINUOUS
Status: DISPENSED | OUTPATIENT
Start: 2019-02-04 | End: 2019-02-05

## 2019-02-04 RX ORDER — DIPHENHYDRAMINE HYDROCHLORIDE 50 MG/ML
25 INJECTION INTRAMUSCULAR; INTRAVENOUS EVERY 4 HOURS PRN
Status: DISCONTINUED | OUTPATIENT
Start: 2019-02-04 | End: 2019-02-04

## 2019-02-04 RX ORDER — DIPHENHYDRAMINE HCL 25 MG
25 CAPSULE ORAL EVERY 4 HOURS PRN
Status: DISCONTINUED | OUTPATIENT
Start: 2019-02-04 | End: 2019-02-06 | Stop reason: HOSPADM

## 2019-02-04 RX ORDER — SODIUM CHLORIDE 9 MG/ML
INJECTION, SOLUTION INTRAVENOUS
Status: DISCONTINUED | OUTPATIENT
Start: 2019-02-04 | End: 2019-02-04

## 2019-02-04 RX ORDER — IBUPROFEN 600 MG/1
600 TABLET ORAL EVERY 6 HOURS
Status: DISCONTINUED | OUTPATIENT
Start: 2019-02-05 | End: 2019-02-06 | Stop reason: HOSPADM

## 2019-02-04 RX ORDER — ACETAMINOPHEN 325 MG/1
650 TABLET ORAL EVERY 6 HOURS PRN
Status: DISCONTINUED | OUTPATIENT
Start: 2019-02-04 | End: 2019-02-06 | Stop reason: HOSPADM

## 2019-02-04 RX ORDER — ONDANSETRON 8 MG/1
8 TABLET, ORALLY DISINTEGRATING ORAL EVERY 8 HOURS PRN
Status: DISCONTINUED | OUTPATIENT
Start: 2019-02-04 | End: 2019-02-04

## 2019-02-04 RX ORDER — OXYTOCIN 10 [USP'U]/ML
10 INJECTION, SOLUTION INTRAMUSCULAR; INTRAVENOUS ONCE
Status: COMPLETED | OUTPATIENT
Start: 2019-02-04 | End: 2019-02-04

## 2019-02-04 RX ORDER — ONDANSETRON 8 MG/1
8 TABLET, ORALLY DISINTEGRATING ORAL EVERY 8 HOURS PRN
Status: DISCONTINUED | OUTPATIENT
Start: 2019-02-04 | End: 2019-02-06 | Stop reason: HOSPADM

## 2019-02-04 RX ORDER — MISOPROSTOL 200 UG/1
600 TABLET ORAL
Status: DISCONTINUED | OUTPATIENT
Start: 2019-02-04 | End: 2019-02-06 | Stop reason: HOSPADM

## 2019-02-04 RX ORDER — HYDROCODONE BITARTRATE AND ACETAMINOPHEN 5; 325 MG/1; MG/1
1 TABLET ORAL EVERY 4 HOURS PRN
Status: DISCONTINUED | OUTPATIENT
Start: 2019-02-04 | End: 2019-02-06 | Stop reason: HOSPADM

## 2019-02-04 RX ORDER — SODIUM CHLORIDE, SODIUM LACTATE, POTASSIUM CHLORIDE, CALCIUM CHLORIDE 600; 310; 30; 20 MG/100ML; MG/100ML; MG/100ML; MG/100ML
INJECTION, SOLUTION INTRAVENOUS CONTINUOUS
Status: DISCONTINUED | OUTPATIENT
Start: 2019-02-04 | End: 2019-02-04

## 2019-02-04 RX ORDER — DOCUSATE SODIUM 100 MG/1
200 CAPSULE, LIQUID FILLED ORAL 2 TIMES DAILY PRN
Status: DISCONTINUED | OUTPATIENT
Start: 2019-02-04 | End: 2019-02-06 | Stop reason: HOSPADM

## 2019-02-04 RX ORDER — OXYTOCIN/RINGER'S LACTATE 20/1000 ML
41.7 PLASTIC BAG, INJECTION (ML) INTRAVENOUS CONTINUOUS
Status: DISCONTINUED | OUTPATIENT
Start: 2019-02-04 | End: 2019-02-04

## 2019-02-04 RX ORDER — MISOPROSTOL 200 UG/1
400 TABLET ORAL ONCE
Status: COMPLETED | OUTPATIENT
Start: 2019-02-04 | End: 2019-02-04

## 2019-02-04 RX ADMIN — OXYTOCIN 10 UNITS: 10 INJECTION, SOLUTION INTRAMUSCULAR; INTRAVENOUS at 08:02

## 2019-02-04 RX ADMIN — Medication 41.65 MILLI-UNITS/MIN: at 09:02

## 2019-02-04 RX ADMIN — MISOPROSTOL 400 MCG: 200 TABLET ORAL at 08:02

## 2019-02-04 RX ADMIN — IBUPROFEN 600 MG: 600 TABLET ORAL at 10:02

## 2019-02-04 NOTE — PROGRESS NOTES
Doing ok  VE per pt request, membrane sweep done per request  IOL offered, declined at this time, will continue with postdates monitoring. Pt reports that if she needs to be induced at 42 weeks she would want her membranes ruptured to avoid cytotec and pitocin, R&B discussed regarding AROM vs. cytotec vs. Pitocin  Ultrasound today with cephalic presentation, anterior/grade 2, MVP 3.8cm, HORTENCIA 9.1cm, BPP 8/8, reviewed with pt and her    B/p recheck 148/92, denies HA, blurry vision, or epigastric pain, PIH warning signs reviewed   Labor precautions and fetal movements reviewed, when to go to hospital   Stressed hydration

## 2019-02-05 PROCEDURE — 25000003 PHARM REV CODE 250: Performed by: ADVANCED PRACTICE MIDWIFE

## 2019-02-05 PROCEDURE — 11000001 HC ACUTE MED/SURG PRIVATE ROOM

## 2019-02-05 PROCEDURE — 99231 SBSQ HOSP IP/OBS SF/LOW 25: CPT | Mod: ,,, | Performed by: ADVANCED PRACTICE MIDWIFE

## 2019-02-05 PROCEDURE — 99231 PR SUBSEQUENT HOSPITAL CARE,LEVL I: ICD-10-PCS | Mod: ,,, | Performed by: ADVANCED PRACTICE MIDWIFE

## 2019-02-05 RX ADMIN — IBUPROFEN 600 MG: 600 TABLET ORAL at 10:02

## 2019-02-05 RX ADMIN — IBUPROFEN 600 MG: 600 TABLET ORAL at 04:02

## 2019-02-05 RX ADMIN — IBUPROFEN 600 MG: 600 TABLET ORAL at 11:02

## 2019-02-05 NOTE — SUBJECTIVE & OBJECTIVE
Hospital course: Admit for labor management, plans NCB  19  tub delivery @ , girl, second degree vaginal floor laceration, breast  19 ppd1 routine care     Interval History:     She is doing well this morning. She is tolerating a regular diet without nausea or vomiting. She is voiding spontaneously. She is ambulating. She has passed flatus, and has not a BM. Vaginal bleeding is mild. She denies fever or chills. Abdominal pain is mild and controlled with oral medications. She is breastfeeding.    Objective:     Vital Signs (Most Recent):  Temp: 98.4 °F (36.9 °C) (19 0800)  Pulse: 82 (19 0800)  Resp: 18 (19 0800)  BP: 119/66 (19 0800) Vital Signs (24h Range):  Temp:  [98.4 °F (36.9 °C)-100 °F (37.8 °C)] 98.4 °F (36.9 °C)  Pulse:  [] 82  Resp:  [17-19] 18  BP: (119-162)/(52-89) 119/66     Weight: 105.7 kg (233 lb)  Body mass index is 39.99 kg/m².      Intake/Output Summary (Last 24 hours) at 2019 1008  Last data filed at 2019 0515  Gross per 24 hour   Intake --   Output 1247 ml   Net -1247 ml       Significant Labs:  Lab Results   Component Value Date    GROUPTRH A POS 2019    HEPBSAG Negative 2018    STREPBCULT No Group B Streptococcus isolated 2019     Recent Labs   Lab 19  1930   HGB 12.1   HCT 36.9*       I have personallly reviewed all pertinent lab results from the last 24 hours.    Physical Exam:   Constitutional: She is oriented to person, place, and time. She appears well-developed and well-nourished.    HENT:   Head: Normocephalic.    Eyes: Pupils are equal, round, and reactive to light.    Neck: Normal range of motion.    Cardiovascular: Normal rate and regular rhythm.     Pulmonary/Chest: Effort normal.        Abdominal: Soft.     Genitourinary: Vagina normal and uterus normal.           Musculoskeletal: Normal range of motion.       Neurological: She is alert and oriented to person, place, and time.    Skin: Skin is warm and dry.     Psychiatric: She has a normal mood and affect. Her behavior is normal. Judgment and thought content normal.

## 2019-02-05 NOTE — NURSING
"Discussed feeding choice with mother.  Reviewed benefits of breastfeeding and risks of formula feeding. Patient given "What to Expect in the First 48 Hours" handout. Mother states her intention is to breastfeed.  "

## 2019-02-05 NOTE — LACTATION NOTE
"Lactation Rounds:     Visited mother at bedside. She was attempting to latch infant in sidelying hold on right breast at start of visit. Infant's body was supine with head turned to face breast, and she was not in contact with mother's body. Small adjustments made to positioning, but latch was not achieved in this hold. Mother reported that infant had been more successful in sidelying overnight. Mother then independently positioned and latched infant to right breast in football hold. Infant's upper lip was initially tucked in, but mother flanged it out somewhat, resulting in better seal at breast. Mother verbalized that latch was comfortable overall, and rhythmic jaw movement was observed. Discussed signs of milk transfer. Infant has had one void and four stools since birth.     Mother stated that she  her previous child for approximately 17 months. She is confident about breastfeeding this baby and stated that "this is already a better experience." She was encouraged to ask for lactation support as needed/desired. Nipple care reviewed. Admit teaching done, including feeding on demand, recognition of feeding cues, expectations for infant feeding/behavior in first day of life, importance of skin to skin contact, hand expression, risks of pacifier use. Mother verbalized her understanding of education. Lactation phone number was provided with encouragement to call with any questions or concerns or for observation of/assistance with next feeding.         02/05/19 1100   Maternal Assessment   Breast Shape Right:;pendulous   Breast Density Right:;soft   Areola Right:;elastic   Nipples Right:;everted   Maternal Infant Feeding   Maternal Emotional State relaxed;independent   Infant Positioning side-lying;clutch/football   Signs of Milk Transfer infant jaw motion present   Pain with Feeding no       "

## 2019-02-05 NOTE — L&D DELIVERY NOTE
[facility]  Vaginal Delivery   Operative Note    SUMMARY     Normal spontaneous vaginal delivery of live infant, was placed on mothers abdomen for skin to skin and bulb suctioning performed.  Infant delivered position RUTH over perineum.  Nuchal cord: No. TUB delivery    Spontaneous delivery of placenta and  pitocin given IM as well as cytotec 400 PO secondary to no IV access.  good uterine tone.  2nd degree laceration noted and repaired in normal fashion with 2.0 vicryl x's 1.  2% lidocaine 10cc as well as lidocaine jelly used to assist with repair.  Patient tolerated delivery well. Sponge needle and lap counted correctly x2.    Indications: Normal labor  Pregnancy complicated by:   Patient Active Problem List   Diagnosis    History of pre-eclampsia    Supervision of normal intrauterine pregnancy in multigravida in third trimester    Elevated blood pressure affecting pregnancy in third trimester, antepartum    Post-term pregnancy, 40-42 weeks of gestation    Normal labor     Admitting GA: 41w1d    Delivery Information for  Girl Beth Jo    Birth information:  Date of birth:    Time of birth:    Sex: female   Head Delivery Date/Time:     Delivery type:    Gestational Age: 41w1d    Delivery Providers    Delivering clinician:             Measurements    Weight:    Length:           Apgars    Living status:    Apgars:   1 min.:   5 min.:   10 min.:   15 min.:   20 min.:     Skin color:          Heart rate:          Reflex irritability:          Muscle tone:          Respiratory effort:          Total:                                 Interventions/Resuscitation         Cord    No data filed        Placenta    Placenta delivery date/time:    Placenta removal:             Labor Events:       labor:       Labor Onset Date/Time:         Dilation Complete Date/Time:         Start Pushing Date/Time:       Rupture Date/Time:              Rupture type:           Fluid Amount:        Fluid Color:         Fluid Odor:        Membrane Status (PeriCalm): SRM (Spontaneous Rupture)      Rupture Date/Time (PeriCalm): 2019 18:45:00      Fluid Amount (PeriCalm):        Fluid Color (PeriCalm): Clear       steroids:       Antibiotics given for GBS:       Induction:       Indications for induction:        Augmentation:       Indications for augmentation:       Labor complications:       Additional complications:          Cervical ripening:                     Delivery:      Episiotomy:       Indication for Episiotomy:       Perineal Lacerations:   Repaired:      Periurethral Laceration:   Repaired:     Labial Laceration:   Repaired:     Sulcus Laceration:   Repaired:     Vaginal Laceration:   Repaired:     Cervical Laceration:   Repaired:     Repair suture:       Repair # of packets:       Vaginal delivery QBL (mL):        QBL (mL): 0     Combined Blood Loss (mL): 0     Vaginal Sweep Performed:       Surgicount Correct:         Other providers:            Details (if applicable):  Trial of Labor      Categorization:      Priority:     Indications for :     Incision Type:       Additional  information:  Forceps:    Vacuum:    Breech:    Observed anomalies    Other (Comments):

## 2019-02-05 NOTE — H&P
[facility] -   Obstetrics  History & Physical    Patient Name: Beth Jo  MRN: [id number]  Admission Date: 2019  Primary Care Provider: Primary Doctor No    Subjective:     Principal Problem:Normal labor    History of Present Illness:  Presents 7 cm in active labor    Obstetric HPI:  Patient reports contractions, active fetal movement, No vaginal bleeding , No loss of fluid     This pregnancy has been complicated by History of Pre-E    Obstetric History       T1      L1     SAB0   TAB0   Ectopic0   Multiple0   Live Births1       # Outcome Date GA Lbr Maik/2nd Weight Sex Delivery Anes PTL Lv   2 Current            1 Term 17 38w1d / 01:48 3.232 kg (7 lb 2 oz) M Vag-Spont EPI N NARINDER      Name: BRYAN, BOY BETH      Apgar1:  8                Apgar5: 9        Past Medical History:   Diagnosis Date    Pre-eclampsia      No past surgical history on file.    PTA Medications   Medication Sig    aspirin 81 MG Chew Take 81 mg by mouth once daily.    multivitamin capsule Take 1 capsule by mouth once daily.    prenatal 25/iron fum/folic/dha (PRENATAL-1 ORAL) Take by mouth.       Review of patient's allergies indicates:   Allergen Reactions    Amoxil [amoxicillin]         Family History     Problem Relation (Age of Onset)    Hypertension Mother        Tobacco Use    Smoking status: Never Smoker    Smokeless tobacco: Never Used   Substance and Sexual Activity    Alcohol use: No    Drug use: No    Sexual activity: Yes     Partners: Male     Birth control/protection: None     Review of Systems   Genitourinary: Positive for pelvic pain.   All other systems reviewed and are negative.     Objective:     Vital Signs (Most Recent):    Vital Signs (24h Range):  BP: (150)/(90) 150/90        There is no height or weight on file to calculate BMI.    FHT:Cat 1 (reassuring)  TOCO:  Q 2 minutes    Physical Exam:   Constitutional: She is oriented to person, place, and time. She appears  well-developed and well-nourished.        Pulmonary/Chest: Effort normal.        Abdominal: Soft.     Genitourinary: Vagina normal and uterus normal.           Musculoskeletal: Normal range of motion and moves all extremeties.       Neurological: She is alert and oriented to person, place, and time.    Skin: Skin is warm and dry.    Psychiatric: She has a normal mood and affect. Her behavior is normal.       Cervix:  Dilation:  7  Effacement:  100%  Station: -1  Presentation: Vertex     Significant Labs:  Lab Results   Component Value Date    GROUPTRH A POS 2018    HEPBSAG Negative 2018    STREPBCULT No Group B Streptococcus isolated 2019       I have personallly reviewed all pertinent lab results from the last 24 hours.    Assessment/Plan:     23 y.o. female  at 41w1d for:    * Normal labor    Admit for labor management         Gwendolyn Anderson CNM  Obstetrics  [facility]

## 2019-02-05 NOTE — HOSPITAL COURSE
Admit for labor management, plans NCB  19  tub delivery @ , girl, second degree vaginal floor laceration, breast  19 ppd1 routine care

## 2019-02-05 NOTE — PROGRESS NOTES
[facility] -   Obstetrics  Postpartum Progress Note    Patient Name: Beth Jo  MRN: [id number]  Admission Date: 2019  Hospital Length of Stay: 1 days  Attending Physician: Marysol Kelley MD  Primary Care Provider: Primary Doctor No    Subjective:     Principal Problem:Normal labor    Hospital course: Admit for labor management, plans NCB  19  tub delivery @ , girl, second degree vaginal floor laceration, breast  19 ppd1 routine care     Interval History:     She is doing well this morning. She is tolerating a regular diet without nausea or vomiting. She is voiding spontaneously. She is ambulating. She has passed flatus, and has not a BM. Vaginal bleeding is mild. She denies fever or chills. Abdominal pain is mild and controlled with oral medications. She is breastfeeding.    Objective:     Vital Signs (Most Recent):  Temp: 98.4 °F (36.9 °C) (19 0800)  Pulse: 82 (19 0800)  Resp: 18 (19 0800)  BP: 119/66 (19 0800) Vital Signs (24h Range):  Temp:  [98.4 °F (36.9 °C)-100 °F (37.8 °C)] 98.4 °F (36.9 °C)  Pulse:  [] 82  Resp:  [17-19] 18  BP: (119-162)/(52-89) 119/66     Weight: 105.7 kg (233 lb)  Body mass index is 39.99 kg/m².      Intake/Output Summary (Last 24 hours) at 2019 1008  Last data filed at 2019 0515  Gross per 24 hour   Intake --   Output 1247 ml   Net -1247 ml       Significant Labs:  Lab Results   Component Value Date    GROUPTRH A POS 2019    HEPBSAG Negative 2018    STREPBCULT No Group B Streptococcus isolated 2019     Recent Labs   Lab 19  1930   HGB 12.1   HCT 36.9*       I have personallly reviewed all pertinent lab results from the last 24 hours.    Physical Exam:   Constitutional: She is oriented to person, place, and time. She appears well-developed and well-nourished.    HENT:   Head: Normocephalic.    Eyes: Pupils are equal, round, and reactive to light.    Neck: Normal range of motion.     Cardiovascular: Normal rate and regular rhythm.     Pulmonary/Chest: Effort normal.        Abdominal: Soft.     Genitourinary: Vagina normal and uterus normal.           Musculoskeletal: Normal range of motion.       Neurological: She is alert and oriented to person, place, and time.    Skin: Skin is warm and dry.    Psychiatric: She has a normal mood and affect. Her behavior is normal. Judgment and thought content normal.       Assessment/Plan:     23 y.o. female  for:    No notes have been filed under this hospital service.  Service: Obstetrics and Gynecology      Disposition: As patient meets milestones, will plan to discharge tomorrow.    Syeda Mcgrath CNM  Obstetrics  [facility]

## 2019-02-05 NOTE — SUBJECTIVE & OBJECTIVE
Obstetric HPI:  Patient reports contractions, active fetal movement, No vaginal bleeding , No loss of fluid     This pregnancy has been complicated by History of Pre-E    Obstetric History       T1      L1     SAB0   TAB0   Ectopic0   Multiple0   Live Births1       # Outcome Date GA Lbr Maik/2nd Weight Sex Delivery Anes PTL Lv   2 Current            1 Term 17 38w1d / 01:48 3.232 kg (7 lb 2 oz) M Vag-Spont EPI N NARINDER      Name: TOM ADAMS      Apgar1:  8                Apgar5: 9        Past Medical History:   Diagnosis Date    Pre-eclampsia      No past surgical history on file.    PTA Medications   Medication Sig    aspirin 81 MG Chew Take 81 mg by mouth once daily.    multivitamin capsule Take 1 capsule by mouth once daily.    prenatal 25/iron fum/folic/dha (PRENATAL-1 ORAL) Take by mouth.       Review of patient's allergies indicates:   Allergen Reactions    Amoxil [amoxicillin]         Family History     Problem Relation (Age of Onset)    Hypertension Mother        Tobacco Use    Smoking status: Never Smoker    Smokeless tobacco: Never Used   Substance and Sexual Activity    Alcohol use: No    Drug use: No    Sexual activity: Yes     Partners: Male     Birth control/protection: None     Review of Systems   Genitourinary: Positive for pelvic pain.   All other systems reviewed and are negative.     Objective:     Vital Signs (Most Recent):    Vital Signs (24h Range):  BP: (150)/(90) 150/90        There is no height or weight on file to calculate BMI.    FHT:Cat 1 (reassuring)  TOCO:  Q 2 minutes    Physical Exam:   Constitutional: She is oriented to person, place, and time. She appears well-developed and well-nourished.        Pulmonary/Chest: Effort normal.        Abdominal: Soft.     Genitourinary: Vagina normal and uterus normal.           Musculoskeletal: Normal range of motion and moves all extremeties.       Neurological: She is alert and oriented to person, place, and time.     Skin: Skin is warm and dry.    Psychiatric: She has a normal mood and affect. Her behavior is normal.       Cervix:  Dilation:  7  Effacement:  100%  Station: -1  Presentation: Vertex     Significant Labs:  Lab Results   Component Value Date    GROUPTRH A POS 05/29/2018    HEPBSAG Negative 05/29/2018    STREPBCULT No Group B Streptococcus isolated 01/02/2019       I have personallly reviewed all pertinent lab results from the last 24 hours.

## 2019-02-05 NOTE — PLAN OF CARE
Problem: Adult Inpatient Plan of Care  Goal: Plan of Care Review  Outcome: Ongoing (interventions implemented as appropriate)  Patient appears to be progressing well, VSS. Fundus is firm and bleeding is light to moderate. Pit infusing at 125 ml/hr. Voids spontaneously. Ambulates without difficulty. Bonds well with infant. Support offered with breastfeeding. Continue to monitor.

## 2019-02-06 VITALS
HEART RATE: 80 BPM | DIASTOLIC BLOOD PRESSURE: 58 MMHG | SYSTOLIC BLOOD PRESSURE: 131 MMHG | BODY MASS INDEX: 39.78 KG/M2 | WEIGHT: 233 LBS | TEMPERATURE: 98 F | HEIGHT: 64 IN | RESPIRATION RATE: 20 BRPM

## 2019-02-06 PROCEDURE — 25000003 PHARM REV CODE 250: Performed by: ADVANCED PRACTICE MIDWIFE

## 2019-02-06 PROCEDURE — 99238 HOSP IP/OBS DSCHRG MGMT 30/<: CPT | Mod: ,,, | Performed by: ADVANCED PRACTICE MIDWIFE

## 2019-02-06 PROCEDURE — 99238 PR HOSPITAL DISCHARGE DAY,<30 MIN: ICD-10-PCS | Mod: ,,, | Performed by: ADVANCED PRACTICE MIDWIFE

## 2019-02-06 RX ADMIN — IBUPROFEN 600 MG: 600 TABLET ORAL at 05:02

## 2019-02-06 NOTE — DISCHARGE INSTRUCTIONS

## 2019-02-06 NOTE — LACTATION NOTE
Lactation Rounds: infant output and weight loss WNL. See flow sheet for areola assessment. Infant with feeding cues. Mother positions infant to the right breast in the football position, mother easily obtains deep asymmetrical latch. Latch pain 4/10, stabbing where areola bruise is, quickly subsides to 2/10 and remains for duration of feeding. Upper lip gently flanged outward and remains flanged out; discussed manually flanging lip outward as well has hand expression, nipple care and to call if pain is not fully resolved within 72 hours.    Mother denies any further lactation needs or concerns at this time. Lactation discharge information reviewed.  Mother is aware of warm line and outpatient consultations. Encouraged mother to contact lactation with any questions, concerns, or problems. Contact numbers provided, and mother verbalizes understanding.

## 2019-02-06 NOTE — DISCHARGE SUMMARY
[facility] -   Obstetrics  Discharge Summary      Patient Name: Beth Jo  MRN: [id number]  Admission Date: 2019  Hospital Length of Stay: 2 days  Discharge Date and Time:  2019 8:52 AM  Attending Physician: Marysol Kelley MD   Discharging Provider: Santiago Klein CNM  Primary Care Provider: Primary Doctor No    HPI: Presents 7 cm in active labor    * No surgery found *     Hospital Course:   Admit for labor management, plans NCB  19  tub delivery @ , girl, second degree vaginal floor laceration, breast  19 ppd1 routine care         Final Active Diagnoses:    Diagnosis Date Noted POA    PRINCIPAL PROBLEM:  Normal vaginal delivery [O80] 2017 Not Applicable    Normal  (single liveborn) [Z38.2] 2019 No    Obstetrical laceration, second degree [O70.1] 2017 No      Problems Resolved During this Admission:    Diagnosis Date Noted Date Resolved POA    Normal labor [O80, Z37.9] 2019 Not Applicable    Vaginal delivery [O80] 2019 Not Applicable        Labs: All labs within the past 24 hours have been reviewed    Feeding Method: breast    Immunizations     Date Immunization Status Dose Route/Site Given by    19 MMR Incomplete 0.5 mL Subcutaneous/Left deltoid     19 Tdap Incomplete 0.5 mL Intramuscular/Left deltoid           Delivery:    Episiotomy: None   Lacerations: 2nd   Repair suture:     Repair # of packets: 1   Blood loss (ml): 250     Birth information:  YOB: 2021   Time of birth: 8:02 PM   Sex: female   Delivery type: Vaginal, Spontaneous   Gestational Age: 41w1d    Delivery Clinician:      Other providers:       Additional  information:  Forceps:    Vacuum:    Breech:    Observed anomalies      Living?:           APGARS  One minute Five minutes Ten minutes   Skin color:         Heart rate:         Grimace:         Muscle tone:         Breathing:         Totals: 8  9         Placenta: Delivered:       appearance    Pending Diagnostic Studies:     None          Discharged Condition: good    Disposition: Home or Self Care    Follow Up:  Follow-up Information     Danielle Pearce CNM. Go in 4 weeks.    Specialty:  Obstetrics  Why:  Postpartum Visit  Contact information:  [address]  [place] [address] [phone]                 Patient Instructions:   No discharge procedures on file.  Medications:  Current Discharge Medication List      CONTINUE these medications which have NOT CHANGED    Details   aspirin 81 MG Chew Take 81 mg by mouth once daily.      multivitamin capsule Take 1 capsule by mouth once daily.      prenatal 25/iron fum/folic/dha (PRENATAL-1 ORAL) Take by mouth.             Santiago Klein CNM  Obstetrics  [facility] -

## 2019-03-07 ENCOUNTER — POSTPARTUM VISIT (OUTPATIENT)
Dept: OBSTETRICS AND GYNECOLOGY | Facility: CLINIC | Age: 24
End: 2019-03-07
Payer: MEDICAID

## 2019-03-07 VITALS
WEIGHT: 211.63 LBS | HEIGHT: 64 IN | SYSTOLIC BLOOD PRESSURE: 118 MMHG | BODY MASS INDEX: 36.13 KG/M2 | DIASTOLIC BLOOD PRESSURE: 78 MMHG

## 2019-03-07 PROBLEM — Z87.59 HISTORY OF PRE-ECLAMPSIA: Status: RESOLVED | Noted: 2018-05-29 | Resolved: 2019-03-07

## 2019-03-07 PROCEDURE — 59430 PR CARE AFTER DELIVERY ONLY: ICD-10-PCS | Mod: TH,S$PBB,, | Performed by: ADVANCED PRACTICE MIDWIFE

## 2019-03-07 PROCEDURE — 99999 PR PBB SHADOW E&M-EST. PATIENT-LVL III: CPT | Mod: PBBFAC,,, | Performed by: ADVANCED PRACTICE MIDWIFE

## 2019-03-07 PROCEDURE — 99213 OFFICE O/P EST LOW 20 MIN: CPT | Mod: PBBFAC | Performed by: ADVANCED PRACTICE MIDWIFE

## 2019-03-07 PROCEDURE — 99999 PR PBB SHADOW E&M-EST. PATIENT-LVL III: ICD-10-PCS | Mod: PBBFAC,,, | Performed by: ADVANCED PRACTICE MIDWIFE

## 2019-03-07 NOTE — PROGRESS NOTES
23 y.o. female for postpartum visit.  Patient has no complaints.  Her delivery records were reviewed.  She is breast feeding infant.    Exam  General - well appearing, no apparent distress  Abdomen - soft, non tender, non distended incision none.  Pelvic - normal external genitalia, any lacerations healing, sutures still remain, tissue approximated               uterus non tender, appropriately sized  Extremeties - no edema    Assessment:  Encounter Diagnosis   Name Primary?    Routine postpartum follow-up Yes        F/u -discussed AGUILA, breastfeeding safe contraception, pt declines. Pap done at birth center of  19 months ago, pap guidelines reviewed.  return for annual exam. al

## 2019-05-10 ENCOUNTER — TELEPHONE (OUTPATIENT)
Dept: OBSTETRICS AND GYNECOLOGY | Facility: CLINIC | Age: 24
End: 2019-05-10

## 2019-05-10 NOTE — TELEPHONE ENCOUNTER
Called and l/m with patient to see if she wanted to move appt to come in Monday 05/13 for her appt with magdy Henderson

## 2019-05-13 ENCOUNTER — OFFICE VISIT (OUTPATIENT)
Dept: OBSTETRICS AND GYNECOLOGY | Facility: CLINIC | Age: 24
End: 2019-05-13
Payer: MEDICAID

## 2019-05-13 VITALS
WEIGHT: 210.75 LBS | HEIGHT: 64 IN | SYSTOLIC BLOOD PRESSURE: 118 MMHG | DIASTOLIC BLOOD PRESSURE: 82 MMHG | BODY MASS INDEX: 35.98 KG/M2

## 2019-05-13 DIAGNOSIS — F41.8 POSTPARTUM ANXIETY: Primary | ICD-10-CM

## 2019-05-13 PROCEDURE — 99213 OFFICE O/P EST LOW 20 MIN: CPT | Mod: PBBFAC,TH | Performed by: ADVANCED PRACTICE MIDWIFE

## 2019-05-13 PROCEDURE — 99213 PR OFFICE/OUTPT VISIT, EST, LEVL III, 20-29 MIN: ICD-10-PCS | Mod: S$PBB,TH,, | Performed by: ADVANCED PRACTICE MIDWIFE

## 2019-05-13 PROCEDURE — 99999 PR PBB SHADOW E&M-EST. PATIENT-LVL III: CPT | Mod: PBBFAC,,, | Performed by: ADVANCED PRACTICE MIDWIFE

## 2019-05-13 PROCEDURE — 99213 OFFICE O/P EST LOW 20 MIN: CPT | Mod: S$PBB,TH,, | Performed by: ADVANCED PRACTICE MIDWIFE

## 2019-05-13 PROCEDURE — 99999 PR PBB SHADOW E&M-EST. PATIENT-LVL III: ICD-10-PCS | Mod: PBBFAC,,, | Performed by: ADVANCED PRACTICE MIDWIFE

## 2019-05-13 RX ORDER — SERTRALINE HYDROCHLORIDE 25 MG/1
25 TABLET, FILM COATED ORAL DAILY
Qty: 90 TABLET | Refills: 3 | Status: SHIPPED | OUTPATIENT
Start: 2019-05-13 | End: 2020-03-13 | Stop reason: SDUPTHER

## 2019-05-13 NOTE — PROGRESS NOTES
Pt presents with c/o OCD/anxiety and described examples of panic attacks and trouble breathing. Has never tried medication in the past but is willing to. Reports that she is still breastfeeding her baby girl. Denies wanting to harm herself or others. Does see therapist and involved in [employer] with guidance from her . Both suggested visit with OB to discuss Postpartum depression/anxiety. Discussed with pt zoloft 25mg daily and non-pharmacological stress reducers. Advised to call if no relief in a couple of weeks. Report to hospital if thoughts of wanting to harm self, others, or baby. Mom appears to be bonded with . Advised NEVER to abruptly stop zoloft and to call in order to wean if wanting to discontinue. Pt verbalizes understanding. Rx sent to pharmacy. Overall, 30 minutes spent with pt.

## 2019-12-30 NOTE — PROGRESS NOTES
"S: pt anxious about pitocin "leading to other things"  O:  VS reviewed, afebrile   Vitals:    04/06/17 2014 04/06/17 2019 04/06/17 2021 04/06/17 2024   BP:   139/80    Pulse: 89 83 81 86   Resp:       Temp:       TempSrc:       SpO2: 100% 99%  99%   Weight:       Height:           FHTs Cat 1  UC Q 2-8 min  SVE 1/70/v/-1 posterior, soft, cervidil removed    A: IUP @ 38w0d ;     Patient Active Problem List   Diagnosis    Headache in pregnancy, antepartum    Severe pre-eclampsia in third trimester    Severe preeclampsia       P: discussed options now that cervidil removed. I do not feel pt can tolerate cook's catheter placement. Pt and  agree. Pt concerned about pitocin leading to c/s. Exp will start at 2 mu/min and increase by 2 mu/min Q 30 min. Cannot stop once started but will only increase until UC q 2-3 min and pt progressing. PT will take some time to accept recommendation/rest. Will start pitocin at midnight.   Continue close monitoring of maternal/fetal status  " Dr. Keith Abbasi (Resident) performed a history and physical exam of the patient and we discussed the management plan.  I reviewed the Resident’s note and agree with the documented findings and plan of care.  Agree with refer to GI - will need GI evaluation and colonoscopy    Sugey Ramos MD

## 2019-12-31 ENCOUNTER — TELEPHONE (OUTPATIENT)
Dept: OBSTETRICS AND GYNECOLOGY | Facility: CLINIC | Age: 24
End: 2019-12-31

## 2020-03-13 DIAGNOSIS — F41.8 POSTPARTUM ANXIETY: ICD-10-CM

## 2020-03-13 RX ORDER — SERTRALINE HYDROCHLORIDE 25 MG/1
25 TABLET, FILM COATED ORAL DAILY
Qty: 90 TABLET | Refills: 3 | Status: SHIPPED | OUTPATIENT
Start: 2020-03-13 | End: 2021-03-13

## 2021-04-29 ENCOUNTER — PATIENT MESSAGE (OUTPATIENT)
Dept: RESEARCH | Facility: HOSPITAL | Age: 26
End: 2021-04-29